# Patient Record
Sex: FEMALE | Race: WHITE | NOT HISPANIC OR LATINO | Employment: UNEMPLOYED | URBAN - METROPOLITAN AREA
[De-identification: names, ages, dates, MRNs, and addresses within clinical notes are randomized per-mention and may not be internally consistent; named-entity substitution may affect disease eponyms.]

---

## 2018-06-29 ENCOUNTER — TRANSCRIBE ORDERS (OUTPATIENT)
Dept: PHYSICAL THERAPY | Facility: CLINIC | Age: 58
End: 2018-06-29

## 2018-06-29 ENCOUNTER — TREATMENT (OUTPATIENT)
Dept: PHYSICAL THERAPY | Facility: CLINIC | Age: 58
End: 2018-06-29

## 2018-06-29 DIAGNOSIS — M25.561 ACUTE PAIN OF RIGHT KNEE: ICD-10-CM

## 2018-06-29 DIAGNOSIS — M17.10 ARTHRITIS OF KNEE: Primary | ICD-10-CM

## 2018-06-29 PROCEDURE — 97110 THERAPEUTIC EXERCISES: CPT | Performed by: PHYSICAL THERAPIST

## 2018-06-29 PROCEDURE — 97161 PT EVAL LOW COMPLEX 20 MIN: CPT | Performed by: PHYSICAL THERAPIST

## 2018-06-29 PROCEDURE — 97112 NEUROMUSCULAR REEDUCATION: CPT | Performed by: PHYSICAL THERAPIST

## 2018-06-29 PROCEDURE — 97035 APP MDLTY 1+ULTRASOUND EA 15: CPT | Performed by: PHYSICAL THERAPIST

## 2018-07-02 ENCOUNTER — TREATMENT (OUTPATIENT)
Dept: PHYSICAL THERAPY | Facility: CLINIC | Age: 58
End: 2018-07-02

## 2018-07-02 DIAGNOSIS — M25.561 ACUTE PAIN OF RIGHT KNEE: ICD-10-CM

## 2018-07-02 DIAGNOSIS — M17.10 ARTHRITIS OF KNEE: Primary | ICD-10-CM

## 2018-07-02 PROCEDURE — 97110 THERAPEUTIC EXERCISES: CPT | Performed by: PHYSICAL THERAPIST

## 2018-07-02 PROCEDURE — 97035 APP MDLTY 1+ULTRASOUND EA 15: CPT | Performed by: PHYSICAL THERAPIST

## 2018-07-02 PROCEDURE — 97112 NEUROMUSCULAR REEDUCATION: CPT | Performed by: PHYSICAL THERAPIST

## 2018-07-06 ENCOUNTER — TREATMENT (OUTPATIENT)
Dept: PHYSICAL THERAPY | Facility: CLINIC | Age: 58
End: 2018-07-06

## 2018-07-06 DIAGNOSIS — M25.561 ACUTE PAIN OF RIGHT KNEE: ICD-10-CM

## 2018-07-06 DIAGNOSIS — M17.10 ARTHRITIS OF KNEE: Primary | ICD-10-CM

## 2018-07-06 PROCEDURE — 97110 THERAPEUTIC EXERCISES: CPT | Performed by: PHYSICAL THERAPIST

## 2018-07-06 PROCEDURE — 97035 APP MDLTY 1+ULTRASOUND EA 15: CPT | Performed by: PHYSICAL THERAPIST

## 2018-07-11 ENCOUNTER — DOCUMENTATION (OUTPATIENT)
Dept: PHYSICAL THERAPY | Facility: CLINIC | Age: 58
End: 2018-07-11

## 2018-07-26 ENCOUNTER — DOCUMENTATION (OUTPATIENT)
Dept: PHYSICAL THERAPY | Facility: CLINIC | Age: 58
End: 2018-07-26

## 2022-03-04 ENCOUNTER — OFFICE (OUTPATIENT)
Dept: URBAN - METROPOLITAN AREA CLINIC 65 | Facility: CLINIC | Age: 62
End: 2022-03-04

## 2022-03-04 VITALS
SYSTOLIC BLOOD PRESSURE: 120 MMHG | HEART RATE: 81 BPM | DIASTOLIC BLOOD PRESSURE: 78 MMHG | WEIGHT: 239 LBS | HEIGHT: 65 IN

## 2022-03-04 DIAGNOSIS — R93.3 ABNORMAL FINDINGS ON DIAGNOSTIC IMAGING OF OTHER PARTS OF DI: ICD-10-CM

## 2022-03-04 DIAGNOSIS — R10.84 GENERALIZED ABDOMINAL PAIN: ICD-10-CM

## 2022-03-04 PROCEDURE — 99203 OFFICE O/P NEW LOW 30 MIN: CPT | Performed by: INTERNAL MEDICINE

## 2022-03-10 ENCOUNTER — OFFICE VISIT (OUTPATIENT)
Dept: CARDIOLOGY | Facility: CLINIC | Age: 62
End: 2022-03-10

## 2022-03-10 VITALS
WEIGHT: 239 LBS | RESPIRATION RATE: 16 BRPM | HEART RATE: 73 BPM | HEIGHT: 65 IN | DIASTOLIC BLOOD PRESSURE: 76 MMHG | BODY MASS INDEX: 39.82 KG/M2 | OXYGEN SATURATION: 98 % | SYSTOLIC BLOOD PRESSURE: 128 MMHG

## 2022-03-10 DIAGNOSIS — I10 PRIMARY HYPERTENSION: Primary | ICD-10-CM

## 2022-03-10 DIAGNOSIS — E78.49 OTHER HYPERLIPIDEMIA: ICD-10-CM

## 2022-03-10 PROCEDURE — 99204 OFFICE O/P NEW MOD 45 MIN: CPT | Performed by: INTERNAL MEDICINE

## 2022-03-10 PROCEDURE — 93000 ELECTROCARDIOGRAM COMPLETE: CPT | Performed by: INTERNAL MEDICINE

## 2022-03-10 RX ORDER — PIOGLITAZONEHYDROCHLORIDE 15 MG/1
15 TABLET ORAL DAILY
COMMUNITY
Start: 2022-02-19

## 2022-03-10 RX ORDER — SITAGLIPTIN AND METFORMIN HYDROCHLORIDE 1000; 50 MG/1; MG/1
1 TABLET, FILM COATED ORAL 2 TIMES DAILY WITH MEALS
COMMUNITY
Start: 2022-02-14

## 2022-03-10 RX ORDER — TRIAMTERENE AND HYDROCHLOROTHIAZIDE 37.5; 25 MG/1; MG/1
TABLET ORAL
COMMUNITY
Start: 2022-03-07

## 2022-03-10 RX ORDER — LISINOPRIL 10 MG/1
10 TABLET ORAL DAILY
COMMUNITY
Start: 2022-02-13

## 2022-03-10 RX ORDER — ATORVASTATIN CALCIUM 10 MG/1
10 TABLET, FILM COATED ORAL DAILY
COMMUNITY
Start: 2022-01-01

## 2022-03-10 RX ORDER — GLIMEPIRIDE 2 MG/1
TABLET ORAL
COMMUNITY
Start: 2022-03-01

## 2022-03-10 RX ORDER — DICYCLOMINE HYDROCHLORIDE 10 MG/1
CAPSULE ORAL
COMMUNITY
Start: 2022-02-02

## 2022-03-29 ENCOUNTER — OFFICE (OUTPATIENT)
Dept: URBAN - METROPOLITAN AREA CLINIC 66 | Facility: CLINIC | Age: 62
End: 2022-03-29

## 2022-03-29 VITALS
HEART RATE: 76 BPM | WEIGHT: 244 LBS | DIASTOLIC BLOOD PRESSURE: 71 MMHG | SYSTOLIC BLOOD PRESSURE: 113 MMHG | HEIGHT: 65 IN

## 2022-03-29 DIAGNOSIS — R10.11 RIGHT UPPER QUADRANT PAIN: ICD-10-CM

## 2022-03-29 PROCEDURE — 99213 OFFICE O/P EST LOW 20 MIN: CPT | Performed by: NURSE PRACTITIONER

## 2022-03-29 RX ORDER — OMEPRAZOLE 20 MG/1
20 CAPSULE, DELAYED RELEASE ORAL
Qty: 30 | Refills: 11 | Status: ACTIVE
Start: 2022-03-29

## 2022-09-22 ENCOUNTER — OFFICE VISIT (OUTPATIENT)
Dept: ORTHOPEDIC SURGERY | Facility: CLINIC | Age: 62
End: 2022-09-22

## 2022-09-22 VITALS — HEIGHT: 65 IN | BODY MASS INDEX: 41.47 KG/M2 | WEIGHT: 248.9 LBS | TEMPERATURE: 97.1 F

## 2022-09-22 DIAGNOSIS — R52 PAIN: Primary | ICD-10-CM

## 2022-09-22 DIAGNOSIS — M25.561 ACUTE PAIN OF RIGHT KNEE: ICD-10-CM

## 2022-09-22 PROCEDURE — 73562 X-RAY EXAM OF KNEE 3: CPT | Performed by: NURSE PRACTITIONER

## 2022-09-22 PROCEDURE — 99204 OFFICE O/P NEW MOD 45 MIN: CPT | Performed by: NURSE PRACTITIONER

## 2022-09-22 RX ORDER — MELOXICAM 7.5 MG/1
7.5 TABLET ORAL DAILY
Qty: 30 TABLET | Refills: 3 | Status: SHIPPED | OUTPATIENT
Start: 2022-09-22 | End: 2023-02-06

## 2022-09-22 RX ORDER — ORAL SEMAGLUTIDE 7 MG/1
1 TABLET ORAL DAILY
COMMUNITY
Start: 2022-09-03

## 2023-02-05 DIAGNOSIS — M25.561 ACUTE PAIN OF RIGHT KNEE: ICD-10-CM

## 2023-02-06 RX ORDER — MELOXICAM 7.5 MG/1
TABLET ORAL
Qty: 30 TABLET | Refills: 0 | Status: SHIPPED | OUTPATIENT
Start: 2023-02-06 | End: 2023-03-14

## 2023-03-07 ENCOUNTER — TELEPHONE (OUTPATIENT)
Dept: CARDIOLOGY | Facility: CLINIC | Age: 63
End: 2023-03-07

## 2023-03-14 DIAGNOSIS — M25.561 ACUTE PAIN OF RIGHT KNEE: ICD-10-CM

## 2023-03-14 RX ORDER — MELOXICAM 7.5 MG/1
TABLET ORAL
Qty: 30 TABLET | Refills: 0 | Status: SHIPPED | OUTPATIENT
Start: 2023-03-14

## 2023-04-13 DIAGNOSIS — M25.561 ACUTE PAIN OF RIGHT KNEE: ICD-10-CM

## 2023-04-13 RX ORDER — MELOXICAM 7.5 MG/1
TABLET ORAL
Qty: 30 TABLET | Refills: 0 | Status: SHIPPED | OUTPATIENT
Start: 2023-04-13 | End: 2023-04-18

## 2023-04-18 DIAGNOSIS — M25.561 ACUTE PAIN OF RIGHT KNEE: ICD-10-CM

## 2023-04-18 RX ORDER — MELOXICAM 7.5 MG/1
TABLET ORAL
Qty: 30 TABLET | Refills: 0 | Status: SHIPPED | OUTPATIENT
Start: 2023-04-18

## 2023-05-18 DIAGNOSIS — M25.561 ACUTE PAIN OF RIGHT KNEE: ICD-10-CM

## 2023-05-19 RX ORDER — MELOXICAM 7.5 MG/1
TABLET ORAL
Qty: 30 TABLET | Refills: 0 | Status: SHIPPED | OUTPATIENT
Start: 2023-05-19

## 2023-05-31 ENCOUNTER — OFFICE VISIT (OUTPATIENT)
Dept: CARDIOLOGY | Facility: CLINIC | Age: 63
End: 2023-05-31

## 2023-05-31 VITALS
WEIGHT: 242 LBS | SYSTOLIC BLOOD PRESSURE: 142 MMHG | HEART RATE: 64 BPM | BODY MASS INDEX: 40.32 KG/M2 | DIASTOLIC BLOOD PRESSURE: 84 MMHG | RESPIRATION RATE: 16 BRPM | OXYGEN SATURATION: 100 % | HEIGHT: 65 IN

## 2023-05-31 DIAGNOSIS — I10 PRIMARY HYPERTENSION: ICD-10-CM

## 2023-05-31 DIAGNOSIS — R60.0 EDEMA LEG: Primary | ICD-10-CM

## 2023-05-31 DIAGNOSIS — E78.49 OTHER HYPERLIPIDEMIA: ICD-10-CM

## 2023-05-31 DIAGNOSIS — R06.09 DOE (DYSPNEA ON EXERTION): ICD-10-CM

## 2023-05-31 PROCEDURE — 99214 OFFICE O/P EST MOD 30 MIN: CPT | Performed by: INTERNAL MEDICINE

## 2023-05-31 PROCEDURE — 93000 ELECTROCARDIOGRAM COMPLETE: CPT | Performed by: INTERNAL MEDICINE

## 2023-05-31 RX ORDER — FUROSEMIDE 20 MG/1
20 TABLET ORAL DAILY PRN
Qty: 30 TABLET | Refills: 2 | Status: SHIPPED | OUTPATIENT
Start: 2023-05-31

## 2023-11-06 ENCOUNTER — OFFICE VISIT (OUTPATIENT)
Dept: ORTHOPEDIC SURGERY | Facility: CLINIC | Age: 63
End: 2023-11-06
Payer: COMMERCIAL

## 2023-11-06 VITALS — HEIGHT: 65 IN | TEMPERATURE: 96.4 F | WEIGHT: 239 LBS | BODY MASS INDEX: 39.82 KG/M2

## 2023-11-06 DIAGNOSIS — R52 PAIN: Primary | ICD-10-CM

## 2023-11-06 DIAGNOSIS — M25.561 ACUTE PAIN OF RIGHT KNEE: ICD-10-CM

## 2023-11-06 PROCEDURE — 20610 DRAIN/INJ JOINT/BURSA W/O US: CPT | Performed by: NURSE PRACTITIONER

## 2023-11-06 PROCEDURE — 73562 X-RAY EXAM OF KNEE 3: CPT | Performed by: NURSE PRACTITIONER

## 2023-11-06 RX ORDER — METHYLPREDNISOLONE ACETATE 80 MG/ML
80 INJECTION, SUSPENSION INTRA-ARTICULAR; INTRALESIONAL; INTRAMUSCULAR; SOFT TISSUE
Status: COMPLETED | OUTPATIENT
Start: 2023-11-06 | End: 2023-11-06

## 2023-11-06 RX ADMIN — METHYLPREDNISOLONE ACETATE 80 MG: 80 INJECTION, SUSPENSION INTRA-ARTICULAR; INTRALESIONAL; INTRAMUSCULAR; SOFT TISSUE at 09:59

## 2024-04-28 DIAGNOSIS — M25.561 ACUTE PAIN OF RIGHT KNEE: ICD-10-CM

## 2024-04-29 RX ORDER — MELOXICAM 7.5 MG/1
TABLET ORAL
Qty: 30 TABLET | Refills: 0 | Status: SHIPPED | OUTPATIENT
Start: 2024-04-29

## 2024-05-24 DIAGNOSIS — M25.561 ACUTE PAIN OF RIGHT KNEE: ICD-10-CM

## 2024-05-24 RX ORDER — MELOXICAM 7.5 MG/1
TABLET ORAL
Qty: 30 TABLET | Refills: 0 | Status: SHIPPED | OUTPATIENT
Start: 2024-05-24

## 2024-06-19 DIAGNOSIS — M25.561 ACUTE PAIN OF RIGHT KNEE: ICD-10-CM

## 2024-06-20 RX ORDER — MELOXICAM 7.5 MG/1
TABLET ORAL
Qty: 30 TABLET | Refills: 0 | Status: SHIPPED | OUTPATIENT
Start: 2024-06-20

## 2024-07-15 DIAGNOSIS — M25.561 ACUTE PAIN OF RIGHT KNEE: ICD-10-CM

## 2024-07-15 RX ORDER — MELOXICAM 7.5 MG/1
TABLET ORAL
Qty: 30 TABLET | Refills: 0 | Status: SHIPPED | OUTPATIENT
Start: 2024-07-15

## 2024-07-22 ENCOUNTER — TELEPHONE (OUTPATIENT)
Dept: ORTHOPEDIC SURGERY | Facility: CLINIC | Age: 64
End: 2024-07-22

## 2024-07-29 ENCOUNTER — TELEPHONE (OUTPATIENT)
Dept: ORTHOPEDIC SURGERY | Facility: CLINIC | Age: 64
End: 2024-07-29

## 2024-07-30 ENCOUNTER — OFFICE VISIT (OUTPATIENT)
Dept: ORTHOPEDIC SURGERY | Facility: CLINIC | Age: 64
End: 2024-07-30
Payer: COMMERCIAL

## 2024-07-30 VITALS — HEIGHT: 65 IN | TEMPERATURE: 97.7 F | WEIGHT: 239 LBS | BODY MASS INDEX: 39.82 KG/M2

## 2024-07-30 DIAGNOSIS — M25.561 ACUTE PAIN OF RIGHT KNEE: Primary | ICD-10-CM

## 2024-07-30 PROCEDURE — 20610 DRAIN/INJ JOINT/BURSA W/O US: CPT | Performed by: NURSE PRACTITIONER

## 2024-07-30 RX ORDER — METHYLPREDNISOLONE ACETATE 80 MG/ML
80 INJECTION, SUSPENSION INTRA-ARTICULAR; INTRALESIONAL; INTRAMUSCULAR; SOFT TISSUE
Status: COMPLETED | OUTPATIENT
Start: 2024-07-30 | End: 2024-07-30

## 2024-07-30 RX ADMIN — METHYLPREDNISOLONE ACETATE 80 MG: 80 INJECTION, SUSPENSION INTRA-ARTICULAR; INTRALESIONAL; INTRAMUSCULAR; SOFT TISSUE at 14:52

## 2024-07-31 ENCOUNTER — TELEPHONE (OUTPATIENT)
Dept: ORTHOPEDIC SURGERY | Facility: CLINIC | Age: 64
End: 2024-07-31

## 2024-08-09 ENCOUNTER — PREP FOR SURGERY (OUTPATIENT)
Dept: SURGERY | Facility: SURGERY CENTER | Age: 64
End: 2024-08-09
Payer: COMMERCIAL

## 2024-08-09 ENCOUNTER — TELEPHONE (OUTPATIENT)
Dept: GASTROENTEROLOGY | Facility: CLINIC | Age: 64
End: 2024-08-09
Payer: COMMERCIAL

## 2024-08-09 ENCOUNTER — OFFICE VISIT (OUTPATIENT)
Dept: GASTROENTEROLOGY | Facility: CLINIC | Age: 64
End: 2024-08-09
Payer: COMMERCIAL

## 2024-08-09 VITALS
SYSTOLIC BLOOD PRESSURE: 132 MMHG | DIASTOLIC BLOOD PRESSURE: 72 MMHG | WEIGHT: 243.6 LBS | OXYGEN SATURATION: 98 % | HEART RATE: 88 BPM | BODY MASS INDEX: 40.54 KG/M2 | TEMPERATURE: 97 F

## 2024-08-09 DIAGNOSIS — E11.9 TYPE 2 DIABETES MELLITUS WITHOUT COMPLICATION, WITHOUT LONG-TERM CURRENT USE OF INSULIN: Chronic | ICD-10-CM

## 2024-08-09 DIAGNOSIS — Z12.11 COLON CANCER SCREENING: ICD-10-CM

## 2024-08-09 DIAGNOSIS — Z12.11 COLON CANCER SCREENING: Primary | ICD-10-CM

## 2024-08-09 DIAGNOSIS — R74.8 ELEVATED LIVER ENZYMES: Primary | Chronic | ICD-10-CM

## 2024-08-09 PROCEDURE — 99214 OFFICE O/P EST MOD 30 MIN: CPT | Performed by: NURSE PRACTITIONER

## 2024-08-09 RX ORDER — SODIUM CHLORIDE, SODIUM LACTATE, POTASSIUM CHLORIDE, CALCIUM CHLORIDE 600; 310; 30; 20 MG/100ML; MG/100ML; MG/100ML; MG/100ML
30 INJECTION, SOLUTION INTRAVENOUS CONTINUOUS PRN
OUTPATIENT
Start: 2024-08-09

## 2024-08-09 RX ORDER — GLIPIZIDE 5 MG/1
1 TABLET ORAL DAILY
COMMUNITY

## 2024-08-09 RX ORDER — SODIUM CHLORIDE 0.9 % (FLUSH) 0.9 %
3 SYRINGE (ML) INJECTION EVERY 12 HOURS SCHEDULED
OUTPATIENT
Start: 2024-08-09

## 2024-08-09 RX ORDER — TRIAMTERENE AND HYDROCHLOROTHIAZIDE 37.5; 25 MG/1; MG/1
TABLET ORAL
COMMUNITY

## 2024-08-09 RX ORDER — SODIUM CHLORIDE 0.9 % (FLUSH) 0.9 %
10 SYRINGE (ML) INJECTION AS NEEDED
OUTPATIENT
Start: 2024-08-09

## 2024-08-09 RX ORDER — FUROSEMIDE 40 MG/1
1 TABLET ORAL DAILY
COMMUNITY
End: 2024-08-09

## 2024-08-09 RX ORDER — SIMVASTATIN 10 MG
TABLET ORAL
COMMUNITY
End: 2024-08-09

## 2024-08-13 PROBLEM — Z12.11 COLON CANCER SCREENING: Status: ACTIVE | Noted: 2024-08-09

## 2024-08-30 ENCOUNTER — HOSPITAL ENCOUNTER (OUTPATIENT)
Facility: HOSPITAL | Age: 64
Discharge: HOME | End: 2024-08-30
Admitting: NURSE PRACTITIONER
Payer: COMMERCIAL

## 2024-08-30 DIAGNOSIS — R74.8 ELEVATED LIVER ENZYMES: Chronic | ICD-10-CM

## 2024-08-30 PROCEDURE — 76705 ECHO EXAM OF ABDOMEN: CPT

## 2024-09-04 ENCOUNTER — TELEPHONE (OUTPATIENT)
Dept: GASTROENTEROLOGY | Facility: CLINIC | Age: 64
End: 2024-09-04

## 2024-09-05 ENCOUNTER — TELEPHONE (OUTPATIENT)
Dept: GASTROENTEROLOGY | Facility: CLINIC | Age: 64
End: 2024-09-05

## 2024-09-05 ENCOUNTER — TELEPHONE (OUTPATIENT)
Dept: GASTROENTEROLOGY | Facility: CLINIC | Age: 64
End: 2024-09-05
Payer: COMMERCIAL

## 2024-09-05 DIAGNOSIS — K74.60 CIRRHOSIS OF LIVER WITHOUT ASCITES, UNSPECIFIED HEPATIC CIRRHOSIS TYPE: ICD-10-CM

## 2024-09-05 DIAGNOSIS — R93.2 ABNORMAL LIVER ULTRASOUND: Primary | ICD-10-CM

## 2024-09-17 ENCOUNTER — TELEPHONE (OUTPATIENT)
Dept: ORTHOPEDIC SURGERY | Facility: CLINIC | Age: 64
End: 2024-09-17
Payer: COMMERCIAL

## 2024-09-17 DIAGNOSIS — M25.561 ACUTE PAIN OF RIGHT KNEE: Primary | ICD-10-CM

## 2024-09-19 ENCOUNTER — TELEPHONE (OUTPATIENT)
Dept: CARDIOLOGY | Facility: CLINIC | Age: 64
End: 2024-09-19

## 2024-09-19 ENCOUNTER — TELEPHONE (OUTPATIENT)
Dept: ORTHOPEDIC SURGERY | Facility: CLINIC | Age: 64
End: 2024-09-19
Payer: COMMERCIAL

## 2024-10-03 DIAGNOSIS — K74.60 CIRRHOSIS OF LIVER WITHOUT ASCITES, UNSPECIFIED HEPATIC CIRRHOSIS TYPE: ICD-10-CM

## 2024-10-03 DIAGNOSIS — R93.2 ABNORMAL LIVER ULTRASOUND: ICD-10-CM

## 2024-10-10 ENCOUNTER — OFFICE VISIT (OUTPATIENT)
Dept: ORTHOPEDIC SURGERY | Facility: CLINIC | Age: 64
End: 2024-10-10
Payer: COMMERCIAL

## 2024-10-10 VITALS — TEMPERATURE: 97.5 F | BODY MASS INDEX: 41.79 KG/M2 | HEIGHT: 65 IN | WEIGHT: 250.8 LBS

## 2024-10-10 DIAGNOSIS — M25.561 ACUTE PAIN OF RIGHT KNEE: ICD-10-CM

## 2024-10-10 DIAGNOSIS — M17.11 PRIMARY OSTEOARTHRITIS OF RIGHT KNEE: Primary | ICD-10-CM

## 2024-10-10 PROCEDURE — 99214 OFFICE O/P EST MOD 30 MIN: CPT | Performed by: NURSE PRACTITIONER

## 2024-10-10 RX ORDER — MELOXICAM 7.5 MG/1
7.5 TABLET ORAL DAILY
COMMUNITY
Start: 2024-09-17

## 2024-10-14 ENCOUNTER — TELEPHONE (OUTPATIENT)
Dept: GASTROENTEROLOGY | Facility: CLINIC | Age: 64
End: 2024-10-14
Payer: COMMERCIAL

## 2024-10-14 RX ORDER — HYDROCORTISONE 25 MG/G
CREAM TOPICAL
Qty: 30 G | Refills: 1 | Status: SHIPPED | OUTPATIENT
Start: 2024-10-14

## 2024-10-17 ENCOUNTER — TELEPHONE (OUTPATIENT)
Dept: GASTROENTEROLOGY | Facility: CLINIC | Age: 64
End: 2024-10-17
Payer: COMMERCIAL

## 2024-10-21 ENCOUNTER — OFFICE VISIT (OUTPATIENT)
Dept: CARDIOLOGY | Facility: CLINIC | Age: 64
End: 2024-10-21
Payer: COMMERCIAL

## 2024-10-21 VITALS
SYSTOLIC BLOOD PRESSURE: 122 MMHG | DIASTOLIC BLOOD PRESSURE: 81 MMHG | WEIGHT: 249.4 LBS | OXYGEN SATURATION: 99 % | HEART RATE: 64 BPM | BODY MASS INDEX: 41.55 KG/M2 | HEIGHT: 65 IN

## 2024-10-21 DIAGNOSIS — E78.49 OTHER HYPERLIPIDEMIA: ICD-10-CM

## 2024-10-21 DIAGNOSIS — I10 PRIMARY HYPERTENSION: Primary | ICD-10-CM

## 2024-10-21 DIAGNOSIS — I89.0 LYMPHEDEMA OF LEFT LEG: ICD-10-CM

## 2024-10-21 PROCEDURE — 99214 OFFICE O/P EST MOD 30 MIN: CPT | Performed by: NURSE PRACTITIONER

## 2024-10-21 PROCEDURE — 93000 ELECTROCARDIOGRAM COMPLETE: CPT | Performed by: NURSE PRACTITIONER

## 2024-10-28 ENCOUNTER — HOSPITAL ENCOUNTER (OUTPATIENT)
Facility: SURGERY CENTER | Age: 64
Setting detail: HOSPITAL OUTPATIENT SURGERY
Discharge: HOME | End: 2024-10-28
Attending: INTERNAL MEDICINE | Admitting: INTERNAL MEDICINE
Payer: COMMERCIAL

## 2024-10-28 ENCOUNTER — ANESTHESIA (OUTPATIENT)
Dept: SURGERY | Facility: SURGERY CENTER | Age: 64
End: 2024-10-28
Payer: COMMERCIAL

## 2024-10-28 ENCOUNTER — ANESTHESIA EVENT (OUTPATIENT)
Dept: SURGERY | Facility: SURGERY CENTER | Age: 64
End: 2024-10-28
Payer: COMMERCIAL

## 2024-10-28 VITALS
SYSTOLIC BLOOD PRESSURE: 135 MMHG | DIASTOLIC BLOOD PRESSURE: 71 MMHG | BODY MASS INDEX: 39.94 KG/M2 | RESPIRATION RATE: 16 BRPM | OXYGEN SATURATION: 98 % | WEIGHT: 240 LBS | TEMPERATURE: 97.5 F | HEART RATE: 66 BPM

## 2024-10-28 DIAGNOSIS — Z12.11 COLON CANCER SCREENING: ICD-10-CM

## 2024-10-28 LAB — GLUCOSE BLDC GLUCOMTR-MCNC: 110 MG/DL (ref 70–130)

## 2024-10-28 PROCEDURE — 45378 DIAGNOSTIC COLONOSCOPY: CPT | Performed by: INTERNAL MEDICINE

## 2024-10-28 PROCEDURE — 25010000002 PROPOFOL 10 MG/ML EMULSION: Performed by: ANESTHESIOLOGY

## 2024-10-28 PROCEDURE — 25010000002 LIDOCAINE 1 % SOLUTION: Performed by: ANESTHESIOLOGY

## 2024-10-28 PROCEDURE — 25810000003 LACTATED RINGERS PER 1000 ML: Performed by: NURSE PRACTITIONER

## 2024-10-28 RX ORDER — NALOXONE HCL 0.4 MG/ML
0.2 VIAL (ML) INJECTION AS NEEDED
Status: DISCONTINUED | OUTPATIENT
Start: 2024-10-28 | End: 2024-10-28 | Stop reason: HOSPADM

## 2024-10-28 RX ORDER — PROMETHAZINE HYDROCHLORIDE 25 MG/1
25 SUPPOSITORY RECTAL ONCE AS NEEDED
Status: DISCONTINUED | OUTPATIENT
Start: 2024-10-28 | End: 2024-10-28 | Stop reason: HOSPADM

## 2024-10-28 RX ORDER — DROPERIDOL 2.5 MG/ML
0.62 INJECTION, SOLUTION INTRAMUSCULAR; INTRAVENOUS
Status: DISCONTINUED | OUTPATIENT
Start: 2024-10-28 | End: 2024-10-28 | Stop reason: HOSPADM

## 2024-10-28 RX ORDER — FLUMAZENIL 0.1 MG/ML
0.2 INJECTION INTRAVENOUS AS NEEDED
Status: DISCONTINUED | OUTPATIENT
Start: 2024-10-28 | End: 2024-10-28 | Stop reason: HOSPADM

## 2024-10-28 RX ORDER — SODIUM CHLORIDE 0.9 % (FLUSH) 0.9 %
3 SYRINGE (ML) INJECTION EVERY 12 HOURS SCHEDULED
Status: DISCONTINUED | OUTPATIENT
Start: 2024-10-28 | End: 2024-10-28 | Stop reason: HOSPADM

## 2024-10-28 RX ORDER — PROMETHAZINE HYDROCHLORIDE 12.5 MG/1
25 TABLET ORAL ONCE AS NEEDED
Status: DISCONTINUED | OUTPATIENT
Start: 2024-10-28 | End: 2024-10-28 | Stop reason: HOSPADM

## 2024-10-28 RX ORDER — SODIUM CHLORIDE 0.9 % (FLUSH) 0.9 %
10 SYRINGE (ML) INJECTION AS NEEDED
Status: DISCONTINUED | OUTPATIENT
Start: 2024-10-28 | End: 2024-10-28 | Stop reason: HOSPADM

## 2024-10-28 RX ORDER — FENTANYL CITRATE 50 UG/ML
50 INJECTION, SOLUTION INTRAMUSCULAR; INTRAVENOUS
Status: DISCONTINUED | OUTPATIENT
Start: 2024-10-28 | End: 2024-10-28 | Stop reason: HOSPADM

## 2024-10-28 RX ORDER — LIDOCAINE HYDROCHLORIDE 10 MG/ML
INJECTION, SOLUTION INFILTRATION; PERINEURAL AS NEEDED
Status: DISCONTINUED | OUTPATIENT
Start: 2024-10-28 | End: 2024-10-28 | Stop reason: SURG

## 2024-10-28 RX ORDER — SODIUM CHLORIDE, SODIUM LACTATE, POTASSIUM CHLORIDE, CALCIUM CHLORIDE 600; 310; 30; 20 MG/100ML; MG/100ML; MG/100ML; MG/100ML
30 INJECTION, SOLUTION INTRAVENOUS CONTINUOUS PRN
Status: DISCONTINUED | OUTPATIENT
Start: 2024-10-28 | End: 2024-10-28 | Stop reason: HOSPADM

## 2024-10-28 RX ORDER — DIPHENHYDRAMINE HYDROCHLORIDE 50 MG/ML
12.5 INJECTION INTRAMUSCULAR; INTRAVENOUS
Status: DISCONTINUED | OUTPATIENT
Start: 2024-10-28 | End: 2024-10-28 | Stop reason: HOSPADM

## 2024-10-28 RX ORDER — ONDANSETRON 2 MG/ML
4 INJECTION INTRAMUSCULAR; INTRAVENOUS ONCE AS NEEDED
Status: DISCONTINUED | OUTPATIENT
Start: 2024-10-28 | End: 2024-10-28 | Stop reason: HOSPADM

## 2024-10-28 RX ADMIN — PROPOFOL 200 MCG/KG/MIN: 10 INJECTION, EMULSION INTRAVENOUS at 12:55

## 2024-10-28 RX ADMIN — SODIUM CHLORIDE, SODIUM LACTATE, POTASSIUM CHLORIDE, AND CALCIUM CHLORIDE 30 ML/HR: .6; .31; .03; .02 INJECTION, SOLUTION INTRAVENOUS at 12:29

## 2024-10-28 RX ADMIN — LIDOCAINE HYDROCHLORIDE 30 MG: 10 INJECTION, SOLUTION INFILTRATION; PERINEURAL at 12:55

## 2024-10-28 NOTE — ANESTHESIA PREPROCEDURE EVALUATION
Anesthesia Evaluation     history of anesthetic complications:  PONV  NPO Solid Status: > 8 hours             Airway   Mallampati: I  TM distance: >3 FB  Neck ROM: full  Dental - normal exam     Pulmonary - normal exam   (+) ,sleep apnea on CPAP  Cardiovascular - normal exam    (+) hypertension, hyperlipidemia      Neuro/Psych  GI/Hepatic/Renal/Endo    (+) obesity, diabetes mellitus    Musculoskeletal     Abdominal    Substance History      OB/GYN          Other                    Anesthesia Plan    ASA 3     MAC       Anesthetic plan, risks, benefits, and alternatives have been provided, discussed and informed consent has been obtained with: patient.    CODE STATUS:

## 2024-10-28 NOTE — H&P
No chief complaint on file.      HPI  screening         Problem List:    Patient Active Problem List   Diagnosis    Primary hypertension    Other hyperlipidemia    Colon cancer screening       Medical History:    Past Medical History:   Diagnosis Date    Cancer     Diabetes mellitus     Hyperlipidemia         Social History:    Social History     Socioeconomic History    Marital status:    Tobacco Use    Smoking status: Never    Smokeless tobacco: Never   Vaping Use    Vaping status: Never Used   Substance and Sexual Activity    Alcohol use: No    Drug use: No    Sexual activity: Yes     Partners: Male       Family History:   Family History   Problem Relation Age of Onset    Hypertension Father     Heart disease Father     Anuerysm Father     Heart disease Sister     Heart disease Paternal Aunt     Heart disease Paternal Uncle     Heart disease Paternal Grandfather     Colon cancer Neg Hx     Colon polyps Neg Hx     Crohn's disease Neg Hx     Irritable bowel syndrome Neg Hx     Ulcerative colitis Neg Hx        Surgical History:   Past Surgical History:   Procedure Laterality Date    CHOLECYSTECTOMY      COLONOSCOPY      HYSTERECTOMY         No current facility-administered medications for this encounter.    Current Outpatient Medications:     atorvastatin (LIPITOR) 10 MG tablet, Take 1 tablet by mouth Daily., Disp: , Rfl:     glipizide (GLUCOTROL) 5 MG tablet, Take 1 tablet by mouth Daily., Disp: , Rfl:     Hydrocortisone, Perianal, (ANUSOL-HC) 2.5 % rectal cream, Apply to hemorrhoids twice daily for 10 days, Disp: 30 g, Rfl: 1    Janumet  MG per tablet, Take 1 tablet by mouth 2 (Two) Times a Day With Meals., Disp: , Rfl:     lisinopril (PRINIVIL,ZESTRIL) 10 MG tablet, Take 1 tablet by mouth Daily., Disp: , Rfl:     meloxicam (MOBIC) 7.5 MG tablet, Take 1 tablet by mouth once daily, Disp: 30 tablet, Rfl: 0    meloxicam (MOBIC) 7.5 MG tablet, Take 1 tablet by mouth Daily., Disp: , Rfl:     pioglitazone  (ACTOS) 15 MG tablet, Take 1 tablet by mouth Daily., Disp: , Rfl:     triamterene-hydrochlorothiazide (Maxzide-25) 37.5-25 MG per tablet, Maxzide, Disp: , Rfl:     Allergies: No Known Allergies     The following portions of the patient's history were reviewed by me and updated as appropriate: review of systems, allergies, current medications, past family history, past medical history, past social history, past surgical history and problem list.    There were no vitals filed for this visit.    PHYSICAL EXAM:    CONSTITUTIONAL:  today's vital signs reviewed by me  GASTROINTESTINAL: abdomen is soft nontender nondistended with normal active bowel sounds, no masses are appreciated    Assessment/ Plan  Screening    colonoscopy    Risks and benefits as well as alternatives to endoscopic evaluation were explained to the patient and they voiced understanding and wish to proceed.  These risks include but are not limited to the risk of bleeding, perforation, adverse reaction to sedation, and missed lesions.  The patient was given the opportunity to ask questions prior to the endoscopic procedure.

## 2024-10-28 NOTE — ANESTHESIA POSTPROCEDURE EVALUATION
Patient: Renay Perez    Procedure Summary       Date: 10/28/24 Room / Location: [facility]  [facility]    Anesthesia Start: 1253 Anesthesia Stop: 1317    Procedure: COLONOSCOPY TO CECUM Diagnosis:       Colon cancer screening      (Colon cancer screening [Z12.11])    Surgeons: Aime Tsai MD Provider: Ricardo Gutierres MD    Anesthesia Type: MAC ASA Status: 3            Anesthesia Type: MAC    Vitals  Vitals Value Taken Time   /71 10/28/24 1337   Temp 36.4 °C (97.5 °F) 10/28/24 1317   Pulse 66 10/28/24 1337   Resp 16 10/28/24 1337   SpO2 98 % 10/28/24 1337           Post Anesthesia Care and Evaluation    Patient location during evaluation: bedside  Pain management: adequate    Airway patency: patent  Anesthetic complications: No anesthetic complications    Cardiovascular status: acceptable  Respiratory status: acceptable  Hydration status: acceptable

## 2024-11-06 ENCOUNTER — TELEPHONE (OUTPATIENT)
Dept: ORTHOPEDIC SURGERY | Facility: CLINIC | Age: 64
End: 2024-11-06
Payer: COMMERCIAL

## 2024-11-06 NOTE — TELEPHONE ENCOUNTER
Caller: Renay Perez    Relationship: Self    Best call back number: [phone]    What was the call regarding: PT CALLING TO RELAY THAT THE PT'S INSURANCE COMPANY STATES THEY NEVER RECEIVED AN ORDER FOR THE R KNEE GEL INJECTION THAT WAS SUPPOSEDLY DENIED PREVIOUSLY. PT IS UNSURE WHAT HAPPENED WITH THE GEL INJECTION THAT SHE WAS SUPPOSED TO RECEIVE ON 10/10/24 WITH WILBER MARTINEZ, SINCE HER INSURANCE COMPANY IS UNAWARE OF ANY DENIAL FOR THE INJECTION. PLEASE CONTACT PT TO DISCUSS.      28.2

## 2024-11-06 NOTE — TELEPHONE ENCOUNTER
I have spoken with Mrs. Perez regarding gel injection are not a covered benefit under her plan//appt scheduled for josé miguel injections and to discuss surg

## 2024-11-25 ENCOUNTER — OFFICE VISIT (OUTPATIENT)
Dept: ORTHOPEDIC SURGERY | Facility: CLINIC | Age: 64
End: 2024-11-25
Payer: COMMERCIAL

## 2024-11-25 VITALS — HEIGHT: 65 IN | BODY MASS INDEX: 41.09 KG/M2 | WEIGHT: 246.6 LBS | TEMPERATURE: 98.5 F

## 2024-11-25 DIAGNOSIS — R52 PAIN: Primary | ICD-10-CM

## 2024-11-25 DIAGNOSIS — M17.11 PRIMARY OSTEOARTHRITIS OF RIGHT KNEE: ICD-10-CM

## 2024-11-25 RX ORDER — METHYLPREDNISOLONE ACETATE 80 MG/ML
80 INJECTION, SUSPENSION INTRA-ARTICULAR; INTRALESIONAL; INTRAMUSCULAR; SOFT TISSUE
Status: COMPLETED | OUTPATIENT
Start: 2024-11-25 | End: 2024-11-25

## 2024-11-25 RX ADMIN — METHYLPREDNISOLONE ACETATE 80 MG: 80 INJECTION, SUSPENSION INTRA-ARTICULAR; INTRALESIONAL; INTRAMUSCULAR; SOFT TISSUE at 10:21

## 2024-11-25 NOTE — PROGRESS NOTES
"Patient: Renay Perez  YOB: 1960 64 y.o. female  Medical Record Number: [id number]    Chief Complaints:   Chief Complaint   Patient presents with   • Left Knee - Pain, Follow-up   • Right Knee - Pain, Follow-up       History of Present Illness:Renay Perez is a 64 y.o. female who presents with complaints of worsening in right knee pain.  She only gets temporary relief from the injections she otherwise has tried and failed all other conservative measures, she would like to discuss surgical options today    Allergies: No Known Allergies    Medications:   Current Outpatient Medications   Medication Sig Dispense Refill   • atorvastatin (LIPITOR) 10 MG tablet Take 1 tablet by mouth Daily.     • glipizide (GLUCOTROL) 5 MG tablet Take 1 tablet by mouth Daily.     • Hydrocortisone, Perianal, (ANUSOL-HC) 2.5 % rectal cream Apply to hemorrhoids twice daily for 10 days 30 g 1   • Janumet  MG per tablet Take 1 tablet by mouth 2 (Two) Times a Day With Meals.     • lisinopril (PRINIVIL,ZESTRIL) 10 MG tablet Take 1 tablet by mouth Daily.     • meloxicam (MOBIC) 7.5 MG tablet Take 1 tablet by mouth once daily 30 tablet 0   • pioglitazone (ACTOS) 15 MG tablet Take 1 tablet by mouth Daily.     • triamterene-hydrochlorothiazide (Maxzide-25) 37.5-25 MG per tablet Maxzide       No current facility-administered medications for this visit.         The following portions of the patient's history were reviewed and updated as appropriate: allergies, current medications, past family history, past medical history, past social history, past surgical history and problem list.    Review of Systems:   14 point review of systems was performed. All systems negative except pertinent positives/negatives listed in HPI above    Physical Exam:   Vitals:    11/25/24 1003   Temp: 98.5 °F (36.9 °C)   Weight: 112 kg (246 lb 9.6 oz)   Height: 165.1 cm (65\")   PainSc: 0-No pain   PainLoc: Knee       General: A and O x 3, ASA, NAD    Skin " clear no unusual lesions noted  Right knee patient has no appreciable effusion 120 degrees flexion neutral in extension      Radiology:  Xrays 3views (ap,lateral, sunrise) right knee were ordered and reviewed today secondary to increased pain show bone-on-bone end-stage osteoarthritis with cyst and spur formation.  She has complete destruction of all 3 joint spaces.  Compared to views are unchanged    Assessment/Plan: End-stage osteoarthritis right knee with increased pain    Patient and I discussed options including continued conservative measures such as injections, physical therapy, medications, versus total knee replacement.  Patient would like to hold off on surgery would like to think about it further and will let us know if that something she would like to proceed with she would like a right knee cortisone injection today    Continuation of conservative management vs. TKA discussed.  The patient wishes to proceed with total knee replacement.  At this point the patient has failed the full compliment of conservative treatment and stating complete understanding of the risks/benefits/ anternatives wishes to proceed with surgical treatment.    Risk and benefits of surgery were reviewed.  Including, but not limited to, blood clots or pulmonary embolism, anesthesia risk, infection, fracture, skin/leg numbness, persistent pain/crepitance/popping/catching, failure of the implant, need for future surgeries, hematoma, possible nerve or blood vessel injury, need for transfusion, and potential risk of stroke,heart attack or death, among others.  The patient understands and wishes to proceed.     It was explained that if tissue has been repaired or reconstructed, there is also an increased chance of failure which may require further management.  Following the completion of the discussion, the patient expressed understanding of this planned course of care, all their questions were answered and consent will be obtained  preoperatively.    Operative Plan: [facility] Total Knee Replacement performing the procedure on an outpatient basis with home health rehab     Large Joint Arthrocentesis: R knee  Date/Time: 11/25/2024 10:21 AM  Consent given by: patient  Site marked: site marked  Timeout: Immediately prior to procedure a time out was called to verify the correct patient, procedure, equipment, support staff and site/side marked as required   Supporting Documentation  Indications: pain and joint swelling   Procedure Details  Location: knee - R knee  Preparation: Patient was prepped and draped in the usual sterile fashion  Needle size: 22 G  Approach: anterolateral  Medications administered: 80 mg methylPREDNISolone acetate 80 MG/ML; 2 mL lidocaine (cardiac)  Patient tolerance: patient tolerated the procedure well with no immediate complications         Yumi Reyes, APRN  11/25/2024

## 2024-12-12 ENCOUNTER — OFFICE VISIT (OUTPATIENT)
Dept: GASTROENTEROLOGY | Facility: CLINIC | Age: 64
End: 2024-12-12
Payer: COMMERCIAL

## 2024-12-12 VITALS
OXYGEN SATURATION: 97 % | WEIGHT: 251.4 LBS | DIASTOLIC BLOOD PRESSURE: 74 MMHG | HEART RATE: 71 BPM | TEMPERATURE: 96.6 F | BODY MASS INDEX: 41.88 KG/M2 | SYSTOLIC BLOOD PRESSURE: 138 MMHG | HEIGHT: 65 IN

## 2024-12-12 DIAGNOSIS — K57.90 DIVERTICULOSIS: ICD-10-CM

## 2024-12-12 DIAGNOSIS — K76.0 FATTY LIVER: Primary | ICD-10-CM

## 2024-12-12 PROCEDURE — 99214 OFFICE O/P EST MOD 30 MIN: CPT | Performed by: NURSE PRACTITIONER

## 2024-12-12 RX ORDER — GLIMEPIRIDE 2 MG/1
1 TABLET ORAL 2 TIMES DAILY WITH MEALS
COMMUNITY
Start: 2024-11-19

## 2024-12-12 RX ORDER — AMITRIPTYLINE HYDROCHLORIDE 10 MG/1
1 TABLET ORAL NIGHTLY
COMMUNITY
Start: 2024-11-19 | End: 2025-11-19

## 2024-12-12 NOTE — PROGRESS NOTES
"Chief Complaint   Patient presents with    Hepatic Disease         History of Present Illness  Patient is a 64-year-old female who presents today for follow-up. She is a new patient to me.  She has a history of elevated liver enzymes.  Underwent serologic workup of this which was unremarkable.  She had an ultrasound that showed changes concerning for cirrhosis.  She then had a FibroScan completed which showed as 0 to F1 fibrosis and S3 steatosis. She also had a colonoscopy completed for screening purposes that showed diverticulosis and internal hemorrhoids. ELF was 11.11 consisted with mid risk for development of cirrhosis.    Patient presents today for follow-up after testing.  She is overall feeling well.  She struggles with constipation which has responded well to senna but otherwise denies any GI complaints.     Result Review :       Colonoscopy (10/28/2024 12:46)    ELFQUEST (10/23/2024 10:14)    US Elastography Parenchyma (09/25/2024)    US Liver (08/30/2024 10:30)    Office Visit with Charisse Andrews APRN (08/09/2024)    Vital Signs:   /74   Pulse 71   Temp 96.6 °F (35.9 °C)   Ht 165.1 cm (65\")   Wt 114 kg (251 lb 6.4 oz)   SpO2 97%   BMI 41.84 kg/m²     Body mass index is 41.84 kg/m².     Physical Exam  Vitals reviewed.   Constitutional:       General: She is not in acute distress.     Appearance: She is well-developed.   HENT:      Head: Normocephalic and atraumatic.   Pulmonary:      Effort: Pulmonary effort is normal. No respiratory distress.   Skin:     General: Skin is dry.      Coloration: Skin is not pale.   Neurological:      Mental Status: She is alert and oriented to person, place, and time.   Psychiatric:         Thought Content: Thought content normal.           Assessment and Plan    Diagnoses and all orders for this visit:    1. Fatty liver (Primary)    2. Diverticulosis         Discussion  Patient presents today for follow-up after testing.  She has a history of elevated liver " enzymes.  Serologic workup was negative.  Ultrasound showed changes of the liver concerning for cirrhosis.  She then underwent FibroScan with no evidence of cirrhosis.  This did show moderate to severe fat in the liver which suspect is the cause of liver enzyme elevation but minimal to no fibrosis which we discussed is very reassuring. ELF score does show moderate risk for cirrhosis.  We discussed based on workup, do not feel she has cirrhosis but does have fatty liver and will be at moderate risk for development of cirrhosis with time.  As no fibrosis is present, we discussed she is in a good place to improve liver health with weight loss.    We reviewed dietary and lifestyle modifications to help with fatty liver at today's office visit and weight loss was encouraged.  We will consider repeating FibroScan in 1 to 2 years for reassessment and to reevaluate for fibrosis.    Colonoscopy with evidence of diverticulosis but otherwise normal.  Encouraged high-fiber diet.  Next colon cancer screening due in 7 years.  Constipation currently controlled with Senokot.  If constipation worsens, could consider prescription therapy if needed.          Follow Up   Return in about 1 year (around 12/12/2025).    Patient Instructions   For fatty liver, weight loss is recommended. Recommend following a low fat and low sugar diet. Recommend management of diabetes and elevated cholesterol with primary care provider if indicated. Regular exercise is recommended. Alcohol avoidance is recommended.    For fatty liver, start taking milk thistle daily, available over the counter.     For diverticulosis, follow a high-fiber diet.  Consider starting a daily fiber supplement, such as Metamucil or Citrucel, available over-the-counter.

## 2024-12-12 NOTE — PATIENT INSTRUCTIONS
For fatty liver, weight loss is recommended. Recommend following a low fat and low sugar diet. Recommend management of diabetes and elevated cholesterol with primary care provider if indicated. Regular exercise is recommended. Alcohol avoidance is recommended.    For fatty liver, start taking milk thistle daily, available over the counter.     For diverticulosis, follow a high-fiber diet.  Consider starting a daily fiber supplement, such as Metamucil or Citrucel, available over-the-counter.

## 2025-04-14 ENCOUNTER — PREP FOR SURGERY (OUTPATIENT)
Dept: OTHER | Facility: HOSPITAL | Age: 65
End: 2025-04-14
Payer: MEDICARE

## 2025-04-14 ENCOUNTER — TELEPHONE (OUTPATIENT)
Dept: ORTHOPEDIC SURGERY | Facility: CLINIC | Age: 65
End: 2025-04-14

## 2025-04-14 ENCOUNTER — OFFICE VISIT (OUTPATIENT)
Dept: ORTHOPEDIC SURGERY | Facility: CLINIC | Age: 65
End: 2025-04-14
Payer: MEDICARE

## 2025-04-14 VITALS — BODY MASS INDEX: 42.34 KG/M2 | TEMPERATURE: 98.2 F | WEIGHT: 254.1 LBS | HEIGHT: 65 IN

## 2025-04-14 DIAGNOSIS — M17.11 PRIMARY OSTEOARTHRITIS OF RIGHT KNEE: Primary | ICD-10-CM

## 2025-04-14 PROCEDURE — 1160F RVW MEDS BY RX/DR IN RCRD: CPT | Performed by: NURSE PRACTITIONER

## 2025-04-14 PROCEDURE — 1159F MED LIST DOCD IN RCRD: CPT | Performed by: NURSE PRACTITIONER

## 2025-04-14 PROCEDURE — 99214 OFFICE O/P EST MOD 30 MIN: CPT | Performed by: NURSE PRACTITIONER

## 2025-04-14 RX ORDER — PREGABALIN 75 MG/1
150 CAPSULE ORAL ONCE
OUTPATIENT
Start: 2025-04-14 | End: 2025-04-14

## 2025-04-14 RX ORDER — CHLORHEXIDINE GLUCONATE 500 MG/1
CLOTH TOPICAL 2 TIMES DAILY
OUTPATIENT
Start: 2025-04-14

## 2025-04-14 NOTE — TELEPHONE ENCOUNTER
Spoke to patient and she voiced understanding    She stated she is waiting for Mara to call her to schedule her surgery

## 2025-04-14 NOTE — TELEPHONE ENCOUNTER
Noted. Patient was just seen this morning - will be following up to schedule within 2 office days.

## 2025-04-14 NOTE — PROGRESS NOTES
Patient: Renay Perez  YOB: 1960 65 y.o. female  Medical Record Number: [id number]    Chief Complaints:   Chief Complaint   Patient presents with    Right Knee - Follow-up       History of Present Illness:Renay Perez is a 65 y.o. female who presents with complaints of worsening of right knee pain, she has tried and failed conservative measures including injections, physical therapy, medications, she would like to discuss surgery    Allergies: No Known Allergies    Medications:   Current Outpatient Medications   Medication Sig Dispense Refill    amitriptyline (ELAVIL) 10 MG tablet Take 1 tablet by mouth Every Night.      atorvastatin (LIPITOR) 10 MG tablet Take 1 tablet by mouth Daily.      glimepiride (AMARYL) 2 MG tablet Take 1 tablet by mouth 2 (Two) Times a Day With Meals.      glipizide (GLUCOTROL) 5 MG tablet Take 1 tablet by mouth Daily.      Hydrocortisone, Perianal, (ANUSOL-HC) 2.5 % rectal cream Apply to hemorrhoids twice daily for 10 days 30 g 1    Janumet  MG per tablet Take 1 tablet by mouth 2 (Two) Times a Day With Meals.      lisinopril (PRINIVIL,ZESTRIL) 10 MG tablet Take 1 tablet by mouth Daily.      meloxicam (MOBIC) 7.5 MG tablet Take 1 tablet by mouth once daily 30 tablet 0    pioglitazone (ACTOS) 15 MG tablet Take 1 tablet by mouth Daily.      triamterene-hydrochlorothiazide (Maxzide-25) 37.5-25 MG per tablet Maxzide       No current facility-administered medications for this visit.         The following portions of the patient's history were reviewed and updated as appropriate: allergies, current medications, past family history, past medical history, past social history, past surgical history and problem list.    Review of Systems:   14 point review of systems was performed. All systems negative except pertinent positives/negatives listed in HPI above    Physical Exam:   Vitals:    04/14/25 0920   Temp: 98.2 °F (36.8 °C)   Weight: 115 kg (254 lb 1.6 oz)   Height: 165.1 cm  "(65\")       General: A and O x 3, ASA, NAD    Skin clear no unusual lesions noted  Right knee patient has trace amount of effusion noted with limited range of motion secondary to pain, she does walk with a severe antalgic gait      Radiology:  Xrays 3views (ap,lateral, sunrise) previous x-rays of the right knee as well as 3 views ordered and reviewed today show bone-on-bone end-stage osteoarthritis.  Compared to views are unchanged    Assessment/Plan: End-stage osteoarthritis right knee with increasing pain    Patient and I discussed options, she would like to proceed with right total knee replacement same day home health.  Will continue to work on her lymphedema on her nonoperative leg which has improved and should also help with her current BMI.  She understands that her BMI does need to be less than 40.  Continuation of conservative management vs. TKA discussed.  The patient wishes to proceed with total knee replacement.  At this point the patient has failed the full compliment of conservative treatment and stating complete understanding of the risks/benefits/ anternatives wishes to proceed with surgical treatment.    Risk and benefits of surgery were reviewed.  Including, but not limited to, blood clots or pulmonary embolism, anesthesia risk, infection, fracture, skin/leg numbness, persistent pain/crepitance/popping/catching, failure of the implant, need for future surgeries, hematoma, possible nerve or blood vessel injury, need for transfusion, and potential risk of stroke,heart attack or death, among others.  The patient understands and wishes to proceed.     It was explained that if tissue has been repaired or reconstructed, there is also an increased chance of failure which may require further management.  Following the completion of the discussion, the patient expressed understanding of this planned course of care, all their questions were answered and consent will be obtained preoperatively.    Operative " Plan: [facility] Total Knee Replacement performing the procedure on an outpatient basis with home health rehab       Yumi Reyes, APRN  4/14/2025

## 2025-04-14 NOTE — TELEPHONE ENCOUNTER
Caller: LINETTE   Relationship to Patient: SELF  Phone Number: [phone]  Reason for Call: JARED WOULD LIKE TO KNOW IF SHE CAN WEAR A KNEE BRACE ON HER RIGHT KNEE PRIOR TO SX

## 2025-04-15 ENCOUNTER — TELEPHONE (OUTPATIENT)
Dept: ORTHOPEDIC SURGERY | Facility: CLINIC | Age: 65
End: 2025-04-15
Payer: MEDICARE

## 2025-04-15 NOTE — TELEPHONE ENCOUNTER
When speaking with patient about scheduling Rt TKA, I noticed patient is scheduled for rt carpal tunnel release on 5/16/25 w/ Dr. Iglesias.

## 2025-04-15 NOTE — TELEPHONE ENCOUNTER
Patient would need at least a month out from their carpal tunnel procedure before scheduling her surgery

## 2025-04-17 NOTE — TELEPHONE ENCOUNTER
Caller: Renay Perez    Relationship to patient: Self    Best call back number: [phone]     Patient is needing: PATIENT IS CALLING TO GET AN UPDATE ON GETTING SCHEDULED FOR SX - WANTS TO KNOW IF SHE HAS TO WAIT AFTER HER CARPAL TUNNEL SX   PLEASE ADVISE - SHE ASKED TO SPEAK WITH NOHELIA

## 2025-05-09 ENCOUNTER — HOSPITAL ENCOUNTER (OUTPATIENT)
Dept: CARDIOLOGY | Facility: HOSPITAL | Age: 65
Discharge: HOME | End: 2025-05-09
Payer: MEDICARE

## 2025-05-09 ENCOUNTER — LAB (OUTPATIENT)
Dept: LAB | Facility: HOSPITAL | Age: 65
End: 2025-05-09
Payer: MEDICARE

## 2025-05-09 ENCOUNTER — TRANSCRIBE ORDERS (OUTPATIENT)
Dept: ADMINISTRATIVE | Facility: HOSPITAL | Age: 65
End: 2025-05-09
Payer: MEDICARE

## 2025-05-09 DIAGNOSIS — Z01.818 PRE-OP TESTING: ICD-10-CM

## 2025-05-09 DIAGNOSIS — Z01.818 PRE-OP TESTING: Primary | ICD-10-CM

## 2025-05-09 LAB
ANION GAP SERPL CALCULATED.3IONS-SCNC: 8.2 MMOL/L (ref 5–15)
BUN SERPL-MCNC: 24 MG/DL (ref 8–23)
BUN/CREAT SERPL: 25.5 (ref 7–25)
CALCIUM SPEC-SCNC: 10 MG/DL (ref 8.6–10.5)
CHLORIDE SERPL-SCNC: 99 MMOL/L (ref 98–107)
CO2 SERPL-SCNC: 29.8 MMOL/L (ref 22–29)
CREAT SERPL-MCNC: 0.94 MG/DL (ref 0.57–1)
EGFRCR SERPLBLD CKD-EPI 2021: 67.5 ML/MIN/1.73
GLUCOSE SERPL-MCNC: 84 MG/DL (ref 65–99)
POTASSIUM SERPL-SCNC: 4.3 MMOL/L (ref 3.5–5.2)
SODIUM SERPL-SCNC: 137 MMOL/L (ref 136–145)

## 2025-05-09 PROCEDURE — 80048 BASIC METABOLIC PNL TOTAL CA: CPT

## 2025-05-09 PROCEDURE — 93005 ELECTROCARDIOGRAM TRACING: CPT | Performed by: PLASTIC SURGERY

## 2025-05-09 PROCEDURE — 36415 COLL VENOUS BLD VENIPUNCTURE: CPT

## 2025-05-10 LAB
QT INTERVAL: 409 MS
QTC INTERVAL: 419 MS

## 2025-05-16 ENCOUNTER — HOSPITAL ENCOUNTER (OUTPATIENT)
Facility: SURGERY CENTER | Age: 65
Setting detail: HOSPITAL OUTPATIENT SURGERY
Discharge: HOME | End: 2025-05-16
Attending: PLASTIC SURGERY | Admitting: PLASTIC SURGERY
Payer: MEDICARE

## 2025-05-16 ENCOUNTER — ANESTHESIA (OUTPATIENT)
Dept: SURGERY | Facility: SURGERY CENTER | Age: 65
End: 2025-05-16
Payer: MEDICARE

## 2025-05-16 ENCOUNTER — ANESTHESIA EVENT (OUTPATIENT)
Dept: SURGERY | Facility: SURGERY CENTER | Age: 65
End: 2025-05-16
Payer: MEDICARE

## 2025-05-16 VITALS
DIASTOLIC BLOOD PRESSURE: 71 MMHG | SYSTOLIC BLOOD PRESSURE: 134 MMHG | BODY MASS INDEX: 41.99 KG/M2 | RESPIRATION RATE: 16 BRPM | HEART RATE: 64 BPM | HEIGHT: 65 IN | WEIGHT: 252 LBS | OXYGEN SATURATION: 95 % | TEMPERATURE: 97.1 F

## 2025-05-16 PROCEDURE — 25010000002 CEFAZOLIN PER 500 MG: Performed by: PLASTIC SURGERY

## 2025-05-16 PROCEDURE — 25010000002 MIDAZOLAM PER 1 MG: Performed by: ANESTHESIOLOGY

## 2025-05-16 PROCEDURE — 25010000002 LIDOCAINE 1% - EPINEPHRINE 1:100000 1 %-1:100000 SOLUTION: Performed by: PLASTIC SURGERY

## 2025-05-16 PROCEDURE — 25010000002 FAMOTIDINE 10 MG/ML SOLUTION: Performed by: ANESTHESIOLOGY

## 2025-05-16 PROCEDURE — 25810000003 LACTATED RINGERS PER 1000 ML: Performed by: PLASTIC SURGERY

## 2025-05-16 PROCEDURE — 25010000002 LIDOCAINE 2% SOLUTION: Performed by: ANESTHESIOLOGY

## 2025-05-16 PROCEDURE — 25010000002 PROPOFOL 10 MG/ML EMULSION: Performed by: ANESTHESIOLOGY

## 2025-05-16 PROCEDURE — 64721 CARPAL TUNNEL SURGERY: CPT | Performed by: PLASTIC SURGERY

## 2025-05-16 PROCEDURE — 25010000002 FENTANYL CITRATE (PF) 50 MCG/ML SOLUTION: Performed by: ANESTHESIOLOGY

## 2025-05-16 RX ORDER — FENTANYL CITRATE 50 UG/ML
50 INJECTION, SOLUTION INTRAMUSCULAR; INTRAVENOUS ONCE AS NEEDED
Status: DISCONTINUED | OUTPATIENT
Start: 2025-05-16 | End: 2025-05-16 | Stop reason: HOSPADM

## 2025-05-16 RX ORDER — SODIUM CHLORIDE 0.9 % (FLUSH) 0.9 %
10 SYRINGE (ML) INJECTION AS NEEDED
Status: DISCONTINUED | OUTPATIENT
Start: 2025-05-16 | End: 2025-05-16 | Stop reason: HOSPADM

## 2025-05-16 RX ORDER — SODIUM CHLORIDE 0.9 % (FLUSH) 0.9 %
3-10 SYRINGE (ML) INJECTION AS NEEDED
Status: DISCONTINUED | OUTPATIENT
Start: 2025-05-16 | End: 2025-05-16 | Stop reason: HOSPADM

## 2025-05-16 RX ORDER — MIDAZOLAM HYDROCHLORIDE 1 MG/ML
0.5 INJECTION, SOLUTION INTRAMUSCULAR; INTRAVENOUS
Status: DISCONTINUED | OUTPATIENT
Start: 2025-05-16 | End: 2025-05-16 | Stop reason: HOSPADM

## 2025-05-16 RX ORDER — SODIUM CHLORIDE 0.9 % (FLUSH) 0.9 %
3 SYRINGE (ML) INJECTION EVERY 12 HOURS SCHEDULED
Status: DISCONTINUED | OUTPATIENT
Start: 2025-05-16 | End: 2025-05-16 | Stop reason: HOSPADM

## 2025-05-16 RX ORDER — MIDAZOLAM HYDROCHLORIDE 1 MG/ML
1 INJECTION, SOLUTION INTRAMUSCULAR; INTRAVENOUS
Status: DISCONTINUED | OUTPATIENT
Start: 2025-05-16 | End: 2025-05-16 | Stop reason: HOSPADM

## 2025-05-16 RX ORDER — TRAMADOL HYDROCHLORIDE 50 MG/1
50 TABLET ORAL
COMMUNITY
Start: 2025-05-15 | End: 2025-06-02

## 2025-05-16 RX ORDER — FAMOTIDINE 10 MG/ML
20 INJECTION, SOLUTION INTRAVENOUS ONCE
Status: COMPLETED | OUTPATIENT
Start: 2025-05-16 | End: 2025-05-16

## 2025-05-16 RX ORDER — FAMOTIDINE 10 MG/ML
20 INJECTION, SOLUTION INTRAVENOUS ONCE
Status: DISCONTINUED | OUTPATIENT
Start: 2025-05-16 | End: 2025-05-16 | Stop reason: HOSPADM

## 2025-05-16 RX ORDER — CEFAZOLIN SODIUM 1 G/3ML
2 INJECTION, POWDER, FOR SOLUTION INTRAMUSCULAR; INTRAVENOUS ONCE
Status: COMPLETED | OUTPATIENT
Start: 2025-05-16 | End: 2025-05-16

## 2025-05-16 RX ORDER — SODIUM CHLORIDE, SODIUM LACTATE, POTASSIUM CHLORIDE, CALCIUM CHLORIDE 600; 310; 30; 20 MG/100ML; MG/100ML; MG/100ML; MG/100ML
20 INJECTION, SOLUTION INTRAVENOUS CONTINUOUS
Status: DISCONTINUED | OUTPATIENT
Start: 2025-05-16 | End: 2025-05-16 | Stop reason: HOSPADM

## 2025-05-16 RX ORDER — FUROSEMIDE 20 MG/1
20 TABLET ORAL NIGHTLY
COMMUNITY
Start: 2025-05-07

## 2025-05-16 RX ORDER — LIDOCAINE HYDROCHLORIDE 10 MG/ML
0.5 INJECTION, SOLUTION INFILTRATION; PERINEURAL ONCE AS NEEDED
Status: DISCONTINUED | OUTPATIENT
Start: 2025-05-16 | End: 2025-05-16 | Stop reason: HOSPADM

## 2025-05-16 RX ORDER — SODIUM CHLORIDE, SODIUM LACTATE, POTASSIUM CHLORIDE, CALCIUM CHLORIDE 600; 310; 30; 20 MG/100ML; MG/100ML; MG/100ML; MG/100ML
9 INJECTION, SOLUTION INTRAVENOUS CONTINUOUS
Status: DISCONTINUED | OUTPATIENT
Start: 2025-05-16 | End: 2025-05-16 | Stop reason: HOSPADM

## 2025-05-16 RX ORDER — LIDOCAINE HYDROCHLORIDE 20 MG/ML
INJECTION, SOLUTION INFILTRATION; PERINEURAL AS NEEDED
Status: DISCONTINUED | OUTPATIENT
Start: 2025-05-16 | End: 2025-05-16 | Stop reason: SURG

## 2025-05-16 RX ORDER — LIDOCAINE HYDROCHLORIDE AND EPINEPHRINE 10; 10 MG/ML; UG/ML
INJECTION, SOLUTION INFILTRATION; PERINEURAL AS NEEDED
Status: DISCONTINUED | OUTPATIENT
Start: 2025-05-16 | End: 2025-05-16 | Stop reason: HOSPADM

## 2025-05-16 RX ORDER — FENTANYL CITRATE 50 UG/ML
50 INJECTION, SOLUTION INTRAMUSCULAR; INTRAVENOUS ONCE AS NEEDED
Status: COMPLETED | OUTPATIENT
Start: 2025-05-16 | End: 2025-05-16

## 2025-05-16 RX ADMIN — SODIUM CHLORIDE, SODIUM LACTATE, POTASSIUM CHLORIDE, AND CALCIUM CHLORIDE 20 ML/HR: 600; 310; 30; 20 INJECTION, SOLUTION INTRAVENOUS at 07:02

## 2025-05-16 RX ADMIN — MIDAZOLAM 1 MG: 1 INJECTION INTRAMUSCULAR; INTRAVENOUS at 07:50

## 2025-05-16 RX ADMIN — LIDOCAINE HYDROCHLORIDE 100 MG: 20 INJECTION, SOLUTION INFILTRATION; PERINEURAL at 08:01

## 2025-05-16 RX ADMIN — PROPOFOL 120 MCG/KG/MIN: 10 INJECTION, EMULSION INTRAVENOUS at 08:07

## 2025-05-16 RX ADMIN — CEFAZOLIN 2 G: 1 INJECTION, POWDER, FOR SOLUTION INTRAMUSCULAR; INTRAVENOUS at 07:59

## 2025-05-16 RX ADMIN — FENTANYL CITRATE 100 MCG: 50 INJECTION, SOLUTION INTRAMUSCULAR; INTRAVENOUS at 08:15

## 2025-05-16 RX ADMIN — FAMOTIDINE 20 MG: 10 INJECTION, SOLUTION INTRAVENOUS at 07:49

## 2025-05-16 NOTE — BRIEF OP NOTE
CARPAL TUNNEL RELEASE  Progress Note    Renay Urbina Case  5/16/2025    Pre-op Diagnosis:   Carpal tunnel syndrome on right [G56.01]       Post-Op Diagnosis Codes:     * Carpal tunnel syndrome on right [G56.01]    Procedure(s):      Procedure(s):  RIGHT CARPAL TUNNEL RELEASE              Surgeon(s):  Lee Iglesias MD    Anesthesia: Monitored Anesthesia Care with [facility]    Staff:   Circulator: Mara Maier RN  Scrub Person: Margarita Espinoza       Estimated Blood Loss: minimal    Urine Voided: * No values recorded between 5/16/2025  7:59 AM and 5/16/2025  8:29 AM *    Specimens:                None      Drains: * No LDAs found *    Findings: compressed median nerve      Complications: none          Lee Iglesias MD     Date: 5/16/2025  Time: 08:31 EDT

## 2025-05-16 NOTE — ANESTHESIA PREPROCEDURE EVALUATION
" Anesthesia Evaluation     Patient summary reviewed and Nursing notes reviewed   history of anesthetic complications:   NPO Solid Status: > 8 hours  NPO Liquid Status: > 2 hours           Airway   Mallampati: II  Dental - normal exam     Pulmonary - negative pulmonary ROS   Cardiovascular   Exercise tolerance: good (4-7 METS)    ECG reviewed  Rhythm: regular    (+) hypertension, hyperlipidemia    ROS comment: Sinus rhythm  Atrial premature complex  No Prior Tracing for Comparison  Electronically Signed By: Liss Ellis ([facility]) 2025-05-10 15:37:09      Neuro/Psych- negative ROS  GI/Hepatic/Renal/Endo    (+) obesity, diabetes mellitus    Musculoskeletal     Abdominal    Substance History - negative use     OB/GYN negative ob/gyn ROS         Other   arthritis,   history of cancer                  Anesthesia Plan    ASA 2     MAC     (/91 (BP Location: Left arm, Patient Position: Sitting)   Pulse 67   Temp 36.3 °C (97.3 °F) (Temporal)   Resp 16   Ht 165.1 cm (65\")   Wt 114 kg (252 lb)   LMP  (LMP Unknown)   SpO2 98%   BMI 41.93 kg/m²     I have reviewed the patient's history with the patient and the chart, including all pertinent laboratory results and imaging. I have explained the risks of anesthesia including but not limited to dental damage, corneal abrasion, nerve injury, MI, stroke, and death.    )  intravenous induction     Anesthetic plan, risks, benefits, and alternatives have been provided, discussed and informed consent has been obtained with: patient.      CODE STATUS:         "

## 2025-05-16 NOTE — H&P
[facility]   PREOPERATIVE HISTORY AND PHYSICAL    Patient Name:Renay Perez  : 1960  MRN: [id number]  Primary Care Physician: Felecia Hess APRN  Date of admission: 2025    Subjective   Subjective     Chief Complaint: preoperative evaluation    History of Present Illness  Renay Perez is a 65 y.o. female who presents for preoperative evaluation. She is scheduled for RIGHT CARPAL TUNNEL RELEASE (Right)    Review of Systems     Personal History     Past Medical History:   Diagnosis Date    Cancer     Diabetes mellitus     Hyperlipidemia     Knee swelling     PONV (postoperative nausea and vomiting)        Past Surgical History:   Procedure Laterality Date    BREAST SURGERY Left     marker put in    CHOLECYSTECTOMY      COLONOSCOPY      COLONOSCOPY N/A 10/28/2024    Procedure: COLONOSCOPY TO CECUM;  Surgeon: Aime Tsai MD;  [place] [facility] [place];  Service: Gastroenterology;  Laterality: N/A;  diverticulosis, hemorrhoids    HYSTERECTOMY         Family History: Her family history includes Anuerysm in her father; Heart disease in her father, paternal aunt, paternal grandfather, paternal uncle, and sister; Hypertension in her father.     Social History: She  reports that she has never smoked. She has never used smokeless tobacco. She reports that she does not drink alcohol and does not use drugs.    Home Medications:  Hydrocortisone (Perianal), amitriptyline, atorvastatin, glimepiride, glipizide, lisinopril, meloxicam, pioglitazone, sitaGLIPtin-metFORMIN, and triamterene-hydrochlorothiazide    Allergies:  She has no known allergies.    Objective    Objective     Vitals:         Physical Exam  Cardiovascular:      Rate and Rhythm: Normal rate.   Pulmonary:      Effort: Pulmonary effort is normal.   Musculoskeletal:         General: Normal range of motion.   Skin:     General: Skin is warm and dry.   Neurological:      General: No focal deficit present.      Mental Status: She is alert  and oriented to person, place, and time. Mental status is at baseline.   Psychiatric:         Mood and Affect: Mood normal.         Assessment & Plan   Assessment / Plan     Brief Patient Summary:  Renay Urbina Case is a 65 y.o. female who presents for preoperative evaluation.    Pre-Op Diagnosis Codes:      * Carpal tunnel syndrome on right [G56.01]    Active Hospital Problems:  There are no active hospital problems to display for this patient.    Plan:   Procedure(s):  RIGHT CARPAL TUNNEL RELEASE    The risks, benefits, and alternatives of the procedure including but not limited to infection, bleeding, failure of improvement, need for revision surgery  and risks of the anesthesia were discussed in detail with the patient and questions were answered. No guarantees were made or implied. Informed consent was obtained.    Lee Iglesias MD

## 2025-05-16 NOTE — ANESTHESIA POSTPROCEDURE EVALUATION
"Patient: Renay Urbina Case    Procedure Summary       Date: 05/16/25 Room / Location: [facility]    Anesthesia Start: 0759 Anesthesia Stop: 0832    Procedure: RIGHT CARPAL TUNNEL RELEASE (Right: Wrist) Diagnosis:       Carpal tunnel syndrome on right      (Carpal tunnel syndrome on right [G56.01])    Surgeons: Lee Iglesias MD Provider: Mara Muñoz MD    Anesthesia Type: MAC ASA Status: 2            Anesthesia Type: MAC    Vitals  Vitals Value Taken Time   /91 05/16/25 08:33   Temp 36.2 °C (97.1 °F) 05/16/25 08:34   Pulse 67 05/16/25 08:38   Resp 14 05/16/25 08:34   SpO2 95 % 05/16/25 08:38   Vitals shown include unfiled device data.        Post Anesthesia Care and Evaluation    Patient location during evaluation: bedside  Patient participation: complete - patient participated  Level of consciousness: awake  Pain management: adequate    Airway patency: patent  Anesthetic complications: No anesthetic complications  PONV Status: controlled  Cardiovascular status: acceptable  Respiratory status: acceptable  Hydration status: acceptable    Comments: /91 (BP Location: Left arm, Patient Position: Sitting)   Pulse 63   Temp 36.2 °C (97.1 °F)   Resp 14   Ht 165.1 cm (65\")   Wt 114 kg (252 lb)   LMP  (LMP Unknown)   SpO2 94%   BMI 41.93 kg/m²       "

## 2025-05-21 ENCOUNTER — TELEPHONE (OUTPATIENT)
Dept: ORTHOPEDIC SURGERY | Facility: CLINIC | Age: 65
End: 2025-05-21

## 2025-05-21 ENCOUNTER — TELEPHONE (OUTPATIENT)
Dept: CARDIOLOGY | Age: 65
End: 2025-05-21

## 2025-05-21 NOTE — TELEPHONE ENCOUNTER
REQUEST FOR CARDIAC CLEARANCE    Caller name: Renay Perez     Phone Number: [phone]    Surgeon's name: DR. CAMILA RAMSEY     Type of planned surgery: KNEE REPLACEMENT    Date of planned surgery: 06.16.2025    Type of anesthesia: GENERAL     Have you been experiencing chest pain or shortness of breath? NO    Is your doctor requesting for you to stop any of your medications prior to your surgery? NOT THAT PT IS AWARE OF    Where should we fax the clearance to? PT WAS TOLD TO HAVE SURGEON FAX OVER CARDIAC CLEARANCE FORM. WILL INCLUDE RETURN FAX NUMBER

## 2025-05-21 NOTE — TELEPHONE ENCOUNTER
Hub staff attempted to follow warm transfer process and was unsuccessful     Caller: Renay Perez    Relationship to patient: Self    Best call back number: [phone] (home)       Patient is needing: PATIENT IS SCHEDULED FOR SX 06/16/2025. HER CARIOLOGIST SAYS WE NEED TO SWEND OVER A FORM FOR CARDIAC CLEARANCE. ATTN DR JAY LANGLEY [phone]

## 2025-05-22 ENCOUNTER — TELEPHONE (OUTPATIENT)
Dept: ORTHOPEDIC SURGERY | Facility: CLINIC | Age: 65
End: 2025-05-22

## 2025-05-22 NOTE — TELEPHONE ENCOUNTER
Caller: Renay Perez    Relationship: Self    Best call back number: [phone]    What was the call regarding:PT CALLING TO REQUEST TO CLARIFY PT'S ARRIVAL TIME FOR UPCOMING R KNEE SX. PT STATES SHE WAS ADVISED TODAY TO ARRIVE AT 1:00PM, HOWEVER HER PAPERWORK STATES TO ARRIVE AT 9:00AM. PLEASE CONFIRM ARRIVAL TIME WITH PT.    ADDITIONALLY, PT WAS RECENTLY PRESCRIBED MONJARO FROM PCP. PT WOULD LIKE TO KNOW IF IT IS OK TO START TAKING MONJARO PRIOR TO SX.     PLEASE CONTACT PT TO DISCUSS THE ISSUES ABOVE.

## 2025-05-23 NOTE — TELEPHONE ENCOUNTER
Spoke to patient and relayed arrival time fluctuates until closer to surgery when schedule is set. Patient will get a call the Friday before to confirm arrival time. Patient verbalized understanding.     Regarding Barbra, I relayed instructions available on the pre-op packet. Barbra held for 7 days prior to surgery. Patient verbalized understanding.

## 2025-06-02 ENCOUNTER — PRE-ADMISSION TESTING (OUTPATIENT)
Dept: PREADMISSION TESTING | Facility: HOSPITAL | Age: 65
End: 2025-06-02
Payer: MEDICARE

## 2025-06-02 VITALS
HEART RATE: 66 BPM | OXYGEN SATURATION: 97 % | TEMPERATURE: 98.4 F | SYSTOLIC BLOOD PRESSURE: 134 MMHG | DIASTOLIC BLOOD PRESSURE: 73 MMHG | HEIGHT: 63 IN | BODY MASS INDEX: 44.33 KG/M2 | RESPIRATION RATE: 16 BRPM | WEIGHT: 250.2 LBS

## 2025-06-02 LAB
ANION GAP SERPL CALCULATED.3IONS-SCNC: 9.6 MMOL/L (ref 5–15)
BUN SERPL-MCNC: 24 MG/DL (ref 8–23)
BUN/CREAT SERPL: 25.8 (ref 7–25)
CALCIUM SPEC-SCNC: 10.1 MG/DL (ref 8.6–10.5)
CHLORIDE SERPL-SCNC: 100 MMOL/L (ref 98–107)
CO2 SERPL-SCNC: 27.4 MMOL/L (ref 22–29)
CREAT SERPL-MCNC: 0.93 MG/DL (ref 0.57–1)
DEPRECATED RDW RBC AUTO: 45.2 FL (ref 37–54)
EGFRCR SERPLBLD CKD-EPI 2021: 68.3 ML/MIN/1.73
ERYTHROCYTE [DISTWIDTH] IN BLOOD BY AUTOMATED COUNT: 14.1 % (ref 12.3–15.4)
GLUCOSE SERPL-MCNC: 101 MG/DL (ref 65–99)
HCT VFR BLD AUTO: 34.6 % (ref 34–46.6)
HGB BLD-MCNC: 11.2 G/DL (ref 12–15.9)
MCH RBC QN AUTO: 29 PG (ref 26.6–33)
MCHC RBC AUTO-ENTMCNC: 32.4 G/DL (ref 31.5–35.7)
MCV RBC AUTO: 89.6 FL (ref 79–97)
PLATELET # BLD AUTO: 314 10*3/MM3 (ref 140–450)
PMV BLD AUTO: 9.1 FL (ref 6–12)
POTASSIUM SERPL-SCNC: 4.3 MMOL/L (ref 3.5–5.2)
RBC # BLD AUTO: 3.86 10*6/MM3 (ref 3.77–5.28)
SODIUM SERPL-SCNC: 137 MMOL/L (ref 136–145)
WBC NRBC COR # BLD AUTO: 5.79 10*3/MM3 (ref 3.4–10.8)

## 2025-06-02 PROCEDURE — 36415 COLL VENOUS BLD VENIPUNCTURE: CPT

## 2025-06-02 PROCEDURE — 80048 BASIC METABOLIC PNL TOTAL CA: CPT

## 2025-06-02 PROCEDURE — 85027 COMPLETE CBC AUTOMATED: CPT

## 2025-06-02 NOTE — DISCHARGE INSTRUCTIONS
Take the following medications the morning of surgery: NO MEDS AM OF SURGERY      If you are on an Aspirin or a Blood Thinner please clarify with the surgeon and prescribing physician if and when you are to hold the medication or if you are to continue the medication.  If you are on prescription narcotic pain medication to control your pain you may also take that medication the morning of surgery.      General Instructions:     Do not eat solid food after midnight the night before surgery.  Clear liquids day of surgery are allowed but must be stopped at least two hours before your hospital arrival time.       Allowed clear liquids      Water, sodas, and tea or coffee with no cream or milk added.       12 to 20 ounces of a clear liquid that contains carbohydrates is recommended.  If non-diabetic, have Gatorade or Powerade.  If diabetic, have G2 or Powerade Zero.     Do not have liquids red in color.  Do not consume chicken, beef, pork or vegetable broth or bouillon cubes of any variety as they are not considered clear liquids and are not allowed.  Patients who avoid smoking, chewing tobacco and alcohol for 4 weeks prior to surgery have a reduced risk of post-operative complications.  Quit smoking as many days before surgery as you can.  Do not smoke, use chewing tobacco or drink alcohol the day of surgery.   If applicable bring your C-PAP/ BI-PAP machine in with you to preop day of surgery.  Bring any papers given to you in the doctor’s office.  Wear clean comfortable clothes.  Do not wear contact lenses, false eyelashes or make-up.  Bring a case for your glasses.   Bring crutches or walker if applicable.  Remove all piercings.  Leave jewelry and any other valuables at home.  Hair extensions with metal clips must be removed prior to surgery.  The Pre-Admission Testing nurse will instruct you to bring medications if unable to obtain an accurate list in Pre-Admission Testing.    Day of surgery you will need to let the  preoperative nurse know the last time you took each of your medications.  To ensure a safe environment for patients and staff, we kindly ask that children under the age of 16 not accompany patients.  If you must bring a dependent child or dependent adult please ensure a responsible adult, other than yourself, is present to supervise them.      If you were given a blood bank ID arm band remember to bring it with you the day of surgery.    Preventing a Surgical Site Infection:  For 2 to 3 days before surgery, avoid shaving with a razor because the razor can irritate skin and make it easier to develop an infection.    Any areas of open skin can increase the risk of a post-operative wound infection by allowing bacteria to enter and travel throughout the body.  Notify your surgeon if you have any skin wounds / rashes even if it is not near the expected surgical site.  The area will need assessed to determine if surgery should be delayed until it is healed.  The night prior to surgery shower using a fresh bar of anti-bacterial soap (such as Dial) and clean washcloth.  Sleep in a clean bed with clean clothing.  Do not allow pets to sleep with you.  Shower on the morning of surgery using a fresh bar of anti-bacterial soap (such as Dial) and clean washcloth.  Dry with a clean towel and dress in clean clothing.  Ask your surgeon if you will be receiving antibiotics prior to surgery.  Make sure you, your family, and all healthcare providers clean their hands with soap and water or an alcohol based hand  before caring for you or your wound.  CHLORHEXIDINE CLOTH INSTRUCTIONS  The morning of surgery follow these instructions using the Chlorhexidine cloths you've been given.  These steps reduce bacteria on the body.  Do not use the cloths near your eyes, ears mouth, genitalia or on open wounds.  Throw the cloths away after use but do not try to flush them down a toilet.      Open and remove one cloth at a time from the  package.    Leave the cloth unfolded and begin the bathing.  Massage the skin with the cloths using gentle pressure to remove bacteria.  Do not scrub harshly.   Follow the steps below with one 2% CHG cloth per area (6 total cloths).  One cloth for neck, shoulders and chest.  One cloth for both arms, hands, fingers and underarms (do underarms last).  One cloth for the abdomen followed by groin.  One cloth for right leg and foot including between the toes.  One cloth for left leg and foot including between the toes.  The last cloth is to be used for the back of the neck, back and buttocks.    Allow the CHG to air dry 3 minutes on the skin which will give it time to work and decrease the chance of irritation.  The skin may feel sticky until it is dry.  Do not rinse with water or any other liquid or you will lose the beneficial effects of the CHG.  If mild skin irritation occurs, do rinse the skin to remove the CHG.  Report this to the nurse at time of admission.  Do not apply lotions, creams, ointments, deodorants or perfumes after using the clothes. Dress in clean clothes before coming to the hospital.    Day of surgery:  Your arrival time is approximately two hours before your scheduled surgery time.  Please note if you have an early arrival time the surgery doors do not open before 5:00 AM.  Upon arrival, a Pre-op nurse and Anesthesiologist will review your health history, obtain vital signs, and answer questions you may have.  The only belongings needed at this time will be a list of your home medications and if applicable your C-PAP/BI-PAP machine.  A Pre-op nurse will start an IV and you may receive medication in preparation for surgery, including something to help you relax.     Please be aware that surgery does come with discomfort.  We want to make every effort to control your discomfort so please discuss any uncontrolled symptoms with your nurse.   Your doctor will most likely have prescribed pain  medications.      If you are going home after surgery you will receive individualized written care instructions before being discharged.  A responsible adult must drive you to and from the hospital on the day of your surgery and ideally stay with you through the night.   .  Discharge prescriptions can be filled by the hospital pharmacy during regular pharmacy hours.  If you are having surgery late in the day/evening your prescription may be e-prescribed to your pharmacy.  Please verify your pharmacy hours or chose a 24 hour pharmacy to avoid not having access to your prescription because your pharmacy has closed for the day.    If you are staying overnight following surgery, you will be transported to your hospital room following the recovery period.  [facility] has all private rooms.    If you have any questions please call Pre-Admission Testing at [phone].  Deductibles and co-payments are collected on the day of service. Please be prepared to pay the required co-pay, deductible or deposit on the day of service as defined by your plan.    Call your surgeon immediately if you experience any of the following symptoms:  Sore Throat  Shortness of Breath or difficulty breathing  Cough  Chills  Body soreness or muscle pain  Headache  Fever  New loss of taste or smell  Do not arrive for your surgery ill.  Your procedure will need to be rescheduled to another time.  You will need to call your physician before the day of surgery to avoid any unnecessary exposure to hospital staff as well as other patients.

## 2025-06-05 ENCOUNTER — TELEPHONE (OUTPATIENT)
Dept: ORTHOPEDIC SURGERY | Facility: HOSPITAL | Age: 65
End: 2025-06-05
Payer: MEDICARE

## 2025-06-05 ENCOUNTER — TELEPHONE (OUTPATIENT)
Dept: ORTHOPEDIC SURGERY | Facility: CLINIC | Age: 65
End: 2025-06-05

## 2025-06-05 NOTE — TELEPHONE ENCOUNTER
Caller: Renay Perez    Relationship to patient: Self    Best call back number: [phone]    Patient is needing: PATIENT WANTS ORDER FOR HOME HEALTH. SHE IS SET UP FOR OUTPATIENT PT TO START ON 6/23/25.   SHE IS SCHEDULED FOR SX 6/16/25  PLEASE ADVISE PATIENT ONCE SENT

## 2025-06-05 NOTE — TELEPHONE ENCOUNTER
Risk Factor yes no   Age >75  X   BMI <20 >40 X    Patient History     Chronic Pain (2 or more meds/Pain Management)  X   ETOH (more than 3 drinks Daily)  X   Uncontrolled Depression/Bipolar/Schizoaffective Disorder  X   Arrhythmias--  X   Stent placement/MI  X   DVT/PE  X   Pacemaker  X   HTN (uncontrolled or requiring more than 2 medications) X    CHF/Retained fluids/Edema X    Stroke with Residual   X   COPD/Asthma X    FERNANDO--Non-compliant with CPAP  X   Diabetes (on insulin or more than 2 meds)         A1C:  X   BPH/Urinary retention (on medication)  X   CKD  X   Home environment and support     Current ambulation status (use of cane, walker, W/C, Multiple falls/weakness)  X   Stairs to enter and throughout home X    Lives Alone  X   Doesn't have support at home  X   Outpatient Screening Assessment    Home needs: (Walker/BSC):  Has walker  ? Steps 3 steps in, does have multiple levels but BR is on main floor  Caregiver 24-48hrs post-discharge:      Discharge Plan:   [facility]--Already has OP set up with [facility]    Prescriptions: Meds to Bed     Home medications:   [] Blood thinner/anti-coag therapy--   ? BPH or diuretic-- Lasix  ? BP meds-- Lisinopril, Maxide   ? Pain/Anti-inflammatories--Mobic  DM--Janumet, Actos    Pre-op Education:  Educate patient on spinal anesthesia/pain control:  ? patient verbalize understanding  Educate patient on hospital course/timeline:  ?  patient verbalize understanding    Joint Care Class:  ?  yes [] no  Notes:   PONV

## 2025-06-10 ENCOUNTER — OFFICE VISIT (OUTPATIENT)
Dept: ORTHOPEDIC SURGERY | Facility: CLINIC | Age: 65
End: 2025-06-10
Payer: MEDICARE

## 2025-06-10 VITALS
OXYGEN SATURATION: 98 % | HEART RATE: 68 BPM | HEIGHT: 65 IN | BODY MASS INDEX: 41.3 KG/M2 | SYSTOLIC BLOOD PRESSURE: 138 MMHG | DIASTOLIC BLOOD PRESSURE: 81 MMHG | TEMPERATURE: 97.8 F | WEIGHT: 247.9 LBS

## 2025-06-10 DIAGNOSIS — R52 PAIN: Primary | ICD-10-CM

## 2025-06-10 NOTE — H&P (VIEW-ONLY)
Patient: Renay Urbina Case    Date of Admission: 6/16/2025    YOB: 1960    Medical Record Number: [id number]    Admitting Physician: Dr. Ty Wheeler    Reason for Admission: End Stage Right Knee OA    History of Present Illness: 65 y.o. female presents with severe end stage knee osteoarthritis which has not been responsive to the full compliment of conservative measures. Despite conservative attempts, there is still severe, constant activity-limiting pain. Given the severity of the pain, the patient has elected to proceed with knee replacement.    Allergies: No Known Allergies      Current Medications:  Home Medications:    Current Outpatient Medications on File Prior to Visit   Medication Sig    amitriptyline (ELAVIL) 10 MG tablet Take 1 tablet by mouth Every Night. SLEEP    atorvastatin (LIPITOR) 10 MG tablet Take 1 tablet by mouth Daily.    furosemide (LASIX) 20 MG tablet Take 1 tablet by mouth Every Night.    Janumet  MG per tablet Take 1 tablet by mouth 2 (Two) Times a Day With Meals.    lisinopril (PRINIVIL,ZESTRIL) 10 MG tablet Take 1 tablet by mouth Daily.    meloxicam (MOBIC) 7.5 MG tablet Take 1 tablet by mouth once daily (Patient taking differently: Take 1 tablet by mouth Daily. HOLD ONE WEEK FOR SURGERY)    pioglitazone (ACTOS) 15 MG tablet Take 1 tablet by mouth Every Night.    triamterene-hydrochlorothiazide (Maxzide-25) 37.5-25 MG per tablet Take 1 tablet by mouth Every Night.     No current facility-administered medications on file prior to visit.     PRN Meds:.    PMH:     Past Medical History:   Diagnosis Date    Cellulitis     FROM PEDICURE AT LEAST 20 YEARS    Constipation     Diabetes mellitus     PREDIABTIC    H/O hand surgery     RIGHT CARPAL TUNNEL SURGERY MAY 2025, STERISTRIPS NOTED IN PAT, [facility]    History of COPD     Hyperlipidemia     Hypertension     Irregular heart beat     EXTRA BEAT    Knee swelling     Lymphedema     LEFT LEG/ SEES SPECIALIST    PONV (postoperative  "nausea and vomiting)     Sleep apnea     USES CPAP    Trouble in sleeping     Uterine cancer        PF/Surg/Soc Hx:     Past Surgical History:   Procedure Laterality Date    BREAST SURGERY Left     marker put in    CARPAL TUNNEL RELEASE Right 05/16/2025    Procedure: RIGHT CARPAL TUNNEL RELEASE;  Surgeon: Lee Iglesias MD;  Location: [facility] [place];  Service: Hand;  Laterality: Right;    CHOLECYSTECTOMY      COLONOSCOPY  2010    COLONOSCOPY N/A 10/28/2024    Procedure: COLONOSCOPY TO CECUM;  Surgeon: Aime Tsai MD;  Location: [facility] [place] [facility] [place];  Service: Gastroenterology;  Laterality: N/A;  diverticulosis, hemorrhoids    HYSTERECTOMY      UTERINE MARKER PLACED, BILATERAL TUBES AND OVARIES REMOVED AS WELL        Social History     Occupational History    Not on file   Tobacco Use    Smoking status: Never    Smokeless tobacco: Never   Vaping Use    Vaping status: Never Used   Substance and Sexual Activity    Alcohol use: No    Drug use: No    Sexual activity: Yes     Partners: Male      Social History     Social History Narrative    Not on file        Family History   Problem Relation Age of Onset    Hypertension Father     Heart disease Father     Anuerysm Father     Heart disease Sister     Heart disease Paternal Aunt     Heart disease Paternal Uncle     Heart disease Paternal Grandfather     Colon cancer Neg Hx     Colon polyps Neg Hx     Crohn's disease Neg Hx     Irritable bowel syndrome Neg Hx     Ulcerative colitis Neg Hx     Malig Hyperthermia Neg Hx          Review of Systems:   A 14 point review of systems was performed, pertinent positives discussed above, all other systems are negative    Physical Exam: 65 y.o. female  Vital Signs :   Vitals:    06/10/25 0846   BP: 138/81   Pulse: 68   Temp: 97.8 °F (36.6 °C)   SpO2: 98%   Weight: 112 kg (247 lb 14.4 oz)   Height: 165.1 cm (65\")     General: Alert and Oriented x 3, No acute distress.  Psych: mood and affect appropriate; recent and remote memory " intact  Eyes: conjunctivae clear; pupils equally round and reactive, sclerae anicteric  CV: RRR  Resp: normal respiratory effort  Skin: no rashes or wounds; normal turgor      Xrays:  -3 views (AP, lateral, and sunrise) were ordered and reviewed demonstrating end-stage OA with bone on bone articulation.  -A full length AP xray was ordered and reviewed today for purposes of operative alignment demonstrating end stage arthritic findings. There are no previous full length films for review    Assessment:  End-stage Right knee osteoarthritis. Conservative measures have failed.      Plan:  The plan is to proceed with Right Total Knee Replacement. The patient voiced understanding of the risks, benefits, and alternative forms of treatment that were discussed with Dr Wheeler at the time of scheduling.  Same day [facility]    Yumi Reyes, LUIGI  6/10/2025

## 2025-06-10 NOTE — PROGRESS NOTES
"Patient: Renay Perez  YOB: 1960 65 y.o. female  Medical Record Number: [id number]    Chief Complaints:   Chief Complaint   Patient presents with    Right Knee - Pre-op Exam       History of Present Illness:Renay Urbina Case is a 65 y.o. female who presents     Allergies: No Known Allergies    Medications:   Current Outpatient Medications   Medication Sig Dispense Refill    amitriptyline (ELAVIL) 10 MG tablet Take 1 tablet by mouth Every Night. SLEEP      atorvastatin (LIPITOR) 10 MG tablet Take 1 tablet by mouth Daily.      furosemide (LASIX) 20 MG tablet Take 1 tablet by mouth Every Night.      Janumet  MG per tablet Take 1 tablet by mouth 2 (Two) Times a Day With Meals.      lisinopril (PRINIVIL,ZESTRIL) 10 MG tablet Take 1 tablet by mouth Daily.      meloxicam (MOBIC) 7.5 MG tablet Take 1 tablet by mouth once daily (Patient taking differently: Take 1 tablet by mouth Daily. HOLD ONE WEEK FOR SURGERY) 30 tablet 0    pioglitazone (ACTOS) 15 MG tablet Take 1 tablet by mouth Every Night.      triamterene-hydrochlorothiazide (Maxzide-25) 37.5-25 MG per tablet Take 1 tablet by mouth Every Night.       No current facility-administered medications for this visit.         The following portions of the patient's history were reviewed and updated as appropriate: allergies, current medications, past family history, past medical history, past social history, past surgical history and problem list.    Review of Systems:   14 point review of systems was performed. All systems negative except pertinent positives/negatives listed in HPI above    Physical Exam:   Vitals:    06/10/25 0846   BP: 138/81   Pulse: 68   Temp: 97.8 °F (36.6 °C)   SpO2: 98%   Weight: 112 kg (247 lb 14.4 oz)   Height: 165.1 cm (65\")       General: A and O x 3, ASA, NAD          Radiology:  Xrays 3views (ap,lateral, sunrise) {ordered / reviewed knee:14842} demonstrating {xray findings knee:52514}    Assessment/Plan: ***      Yumi MUNOZ" LUIGI Reyes  6/10/2025

## 2025-06-10 NOTE — H&P
Patient: Renay Urbina Case    Date of Admission: 6/16/2025    YOB: 1960    Medical Record Number: [id number]    Admitting Physician: Dr. Ty Wheeelr    Reason for Admission: End Stage Right Knee OA    History of Present Illness: 65 y.o. female presents with severe end stage knee osteoarthritis which has not been responsive to the full compliment of conservative measures. Despite conservative attempts, there is still severe, constant activity-limiting pain. Given the severity of the pain, the patient has elected to proceed with knee replacement.    Allergies: No Known Allergies      Current Medications:  Home Medications:    Current Outpatient Medications on File Prior to Visit   Medication Sig    amitriptyline (ELAVIL) 10 MG tablet Take 1 tablet by mouth Every Night. SLEEP    atorvastatin (LIPITOR) 10 MG tablet Take 1 tablet by mouth Daily.    furosemide (LASIX) 20 MG tablet Take 1 tablet by mouth Every Night.    Janumet  MG per tablet Take 1 tablet by mouth 2 (Two) Times a Day With Meals.    lisinopril (PRINIVIL,ZESTRIL) 10 MG tablet Take 1 tablet by mouth Daily.    meloxicam (MOBIC) 7.5 MG tablet Take 1 tablet by mouth once daily (Patient taking differently: Take 1 tablet by mouth Daily. HOLD ONE WEEK FOR SURGERY)    pioglitazone (ACTOS) 15 MG tablet Take 1 tablet by mouth Every Night.    triamterene-hydrochlorothiazide (Maxzide-25) 37.5-25 MG per tablet Take 1 tablet by mouth Every Night.     No current facility-administered medications on file prior to visit.     PRN Meds:.    PMH:     Past Medical History:   Diagnosis Date    Cellulitis     FROM PEDICURE AT LEAST 20 YEARS    Constipation     Diabetes mellitus     PREDIABTIC    H/O hand surgery     RIGHT CARPAL TUNNEL SURGERY MAY 2025, STERISTRIPS NOTED IN PAT, CDI    History of COPD     Hyperlipidemia     Hypertension     Irregular heart beat     EXTRA BEAT    Knee swelling     Lymphedema     LEFT LEG/ SEES SPECIALIST    PONV (postoperative  "nausea and vomiting)     Sleep apnea     USES CPAP    Trouble in sleeping     Uterine cancer        PF/Surg/Soc Hx:     Past Surgical History:   Procedure Laterality Date    BREAST SURGERY Left     marker put in    CARPAL TUNNEL RELEASE Right 05/16/2025    Procedure: RIGHT CARPAL TUNNEL RELEASE;  Surgeon: Lee Iglesias MD;  Location: [facility] MAIN OR;  Service: Hand;  Laterality: Right;    CHOLECYSTECTOMY      COLONOSCOPY  2010    COLONOSCOPY N/A 10/28/2024    Procedure: COLONOSCOPY TO CECUM;  Surgeon: Aime Tsai MD;  Location: [facility] MAIN OR;  Service: Gastroenterology;  Laterality: N/A;  diverticulosis, hemorrhoids    HYSTERECTOMY      UTERINE MARKER PLACED, BILATERAL TUBES AND OVARIES REMOVED AS WELL        Social History     Occupational History    Not on file   Tobacco Use    Smoking status: Never    Smokeless tobacco: Never   Vaping Use    Vaping status: Never Used   Substance and Sexual Activity    Alcohol use: No    Drug use: No    Sexual activity: Yes     Partners: Male      Social History     Social History Narrative    Not on file        Family History   Problem Relation Age of Onset    Hypertension Father     Heart disease Father     Anuerysm Father     Heart disease Sister     Heart disease Paternal Aunt     Heart disease Paternal Uncle     Heart disease Paternal Grandfather     Colon cancer Neg Hx     Colon polyps Neg Hx     Crohn's disease Neg Hx     Irritable bowel syndrome Neg Hx     Ulcerative colitis Neg Hx     Malig Hyperthermia Neg Hx          Review of Systems:   A 14 point review of systems was performed, pertinent positives discussed above, all other systems are negative    Physical Exam: 65 y.o. female  Vital Signs :   Vitals:    06/10/25 0846   BP: 138/81   Pulse: 68   Temp: 97.8 °F (36.6 °C)   SpO2: 98%   Weight: 112 kg (247 lb 14.4 oz)   Height: 165.1 cm (65\")     General: Alert and Oriented x 3, No acute distress.  Psych: mood and affect appropriate; recent and remote memory " intact  Eyes: conjunctivae clear; pupils equally round and reactive, sclerae anicteric  CV: RRR  Resp: normal respiratory effort  Skin: no rashes or wounds; normal turgor      Xrays:  -3 views (AP, lateral, and sunrise) were ordered and reviewed demonstrating end-stage OA with bone on bone articulation.  -A full length AP xray was ordered and reviewed today for purposes of operative alignment demonstrating end stage arthritic findings. There are no previous full length films for review    Assessment:  End-stage Right knee osteoarthritis. Conservative measures have failed.      Plan:  The plan is to proceed with Right Total Knee Replacement. The patient voiced understanding of the risks, benefits, and alternative forms of treatment that were discussed with Dr Wheeler at the time of scheduling.  Same day [facility]    Yumi Reyes, LUIGI  6/10/2025

## 2025-06-13 ENCOUNTER — TELEPHONE (OUTPATIENT)
Dept: ORTHOPEDIC SURGERY | Facility: CLINIC | Age: 65
End: 2025-06-13
Payer: MEDICARE

## 2025-06-13 ENCOUNTER — TELEPHONE (OUTPATIENT)
Dept: ORTHOPEDIC SURGERY | Facility: CLINIC | Age: 65
End: 2025-06-13

## 2025-06-13 NOTE — TELEPHONE ENCOUNTER
Left voicemail for patient with arrival time for her procedure on 6/16/25, arrival time being @ 9:00 am.

## 2025-06-13 NOTE — TELEPHONE ENCOUNTER
As long as the cough is mild and she does not feel sick or have a fever, should be okay to proceed.  If her symptoms worsen would definitely recommend going to urgent care or seeing her PCP and would need to hold off on surgery until her illness is treated

## 2025-06-13 NOTE — TELEPHONE ENCOUNTER
[facility]    [place] Discharge Summary    Jeff Dewey Patient Status:  [place]    2021 MRN [id number]   Location [facility]  3SE-N Attending Silvestre Nation MD   Hosp Day # 1 PCP   No primary care provider on file.      Date o Caller: Chris Renayerica Urbina    Relationship to patient: Self    Best call back number: [phone]*    Patient is needing: PT IS CALLING TO SPEAK WITH SOMEONE REGARDING HER SX ON 06/16/25.. PT STATES SHE HAS A LITTLE COUGH AT THE MOMENT.. PLEASE ADVISE..         Neck is supple without adenopathy  Respiratory: Normal to inspection; normal respiratory effort; lungs are clear to auscultation  Cardiovascular: Regular rate and rhythm; no murmurs  Vascular: Normal radial and femoral pulses; normal capillary refill  Abdo

## 2025-06-16 ENCOUNTER — HOSPITAL ENCOUNTER (OUTPATIENT)
Facility: HOSPITAL | Age: 65
Setting detail: HOSPITAL OUTPATIENT SURGERY
Discharge: HOME HEALTH | End: 2025-06-16
Attending: ORTHOPAEDIC SURGERY | Admitting: ORTHOPAEDIC SURGERY
Payer: MEDICARE

## 2025-06-16 ENCOUNTER — ANESTHESIA (OUTPATIENT)
Dept: PERIOP | Facility: HOSPITAL | Age: 65
End: 2025-06-16
Payer: MEDICARE

## 2025-06-16 ENCOUNTER — APPOINTMENT (OUTPATIENT)
Dept: GENERAL RADIOLOGY | Facility: HOSPITAL | Age: 65
End: 2025-06-16
Payer: MEDICARE

## 2025-06-16 ENCOUNTER — ANESTHESIA EVENT (OUTPATIENT)
Dept: PERIOP | Facility: HOSPITAL | Age: 65
End: 2025-06-16
Payer: MEDICARE

## 2025-06-16 VITALS
DIASTOLIC BLOOD PRESSURE: 92 MMHG | SYSTOLIC BLOOD PRESSURE: 142 MMHG | RESPIRATION RATE: 16 BRPM | HEART RATE: 56 BPM | OXYGEN SATURATION: 98 % | TEMPERATURE: 98 F

## 2025-06-16 DIAGNOSIS — M17.11 PRIMARY OSTEOARTHRITIS OF RIGHT KNEE: ICD-10-CM

## 2025-06-16 DIAGNOSIS — Z96.651 S/P TOTAL KNEE REPLACEMENT, RIGHT: Primary | ICD-10-CM

## 2025-06-16 LAB — GLUCOSE BLDC GLUCOMTR-MCNC: 113 MG/DL (ref 70–130)

## 2025-06-16 PROCEDURE — C1713 ANCHOR/SCREW BN/BN,TIS/BN: HCPCS | Performed by: ORTHOPAEDIC SURGERY

## 2025-06-16 PROCEDURE — 25010000002 MIDAZOLAM PER 1 MG: Performed by: ANESTHESIOLOGY

## 2025-06-16 PROCEDURE — 25010000002 FENTANYL CITRATE (PF) 50 MCG/ML SOLUTION

## 2025-06-16 PROCEDURE — 25010000002 ROPIVACAINE PER 1 MG: Performed by: ORTHOPAEDIC SURGERY

## 2025-06-16 PROCEDURE — 73560 X-RAY EXAM OF KNEE 1 OR 2: CPT

## 2025-06-16 PROCEDURE — 97162 PT EVAL MOD COMPLEX 30 MIN: CPT

## 2025-06-16 PROCEDURE — 25010000002 ONDANSETRON PER 1 MG

## 2025-06-16 PROCEDURE — 25010000002 MORPHINE PER 10 MG: Performed by: ORTHOPAEDIC SURGERY

## 2025-06-16 PROCEDURE — C1776 JOINT DEVICE (IMPLANTABLE): HCPCS | Performed by: ORTHOPAEDIC SURGERY

## 2025-06-16 PROCEDURE — 25010000002 EPINEPHRINE 1 MG/ML SOLUTION 30 ML VIAL: Performed by: ORTHOPAEDIC SURGERY

## 2025-06-16 PROCEDURE — 25810000003 SODIUM CHLORIDE 0.9 % SOLUTION: Performed by: NURSE PRACTITIONER

## 2025-06-16 PROCEDURE — 25010000002 ROPIVACAINE PER 1 MG: Performed by: ANESTHESIOLOGY

## 2025-06-16 PROCEDURE — 25810000003 LACTATED RINGERS SOLUTION: Performed by: NURSE PRACTITIONER

## 2025-06-16 PROCEDURE — 82948 REAGENT STRIP/BLOOD GLUCOSE: CPT

## 2025-06-16 PROCEDURE — 25010000002 GLYCOPYRROLATE 0.2 MG/ML SOLUTION

## 2025-06-16 PROCEDURE — 25010000002 FENTANYL CITRATE (PF) 50 MCG/ML SOLUTION: Performed by: ANESTHESIOLOGY

## 2025-06-16 PROCEDURE — 25810000003 LACTATED RINGERS PER 1000 ML: Performed by: ANESTHESIOLOGY

## 2025-06-16 PROCEDURE — 25010000002 ACETAMINOPHEN 10 MG/ML SOLUTION

## 2025-06-16 PROCEDURE — 25010000002 VANCOMYCIN 10 G RECONSTITUTED SOLUTION: Performed by: NURSE PRACTITIONER

## 2025-06-16 PROCEDURE — 27447 TOTAL KNEE ARTHROPLASTY: CPT | Performed by: ORTHOPAEDIC SURGERY

## 2025-06-16 PROCEDURE — 25010000002 DEXAMETHASONE PER 1 MG: Performed by: ANESTHESIOLOGY

## 2025-06-16 PROCEDURE — 25010000002 FAMOTIDINE 10 MG/ML SOLUTION: Performed by: ANESTHESIOLOGY

## 2025-06-16 PROCEDURE — 25010000002 HYDROMORPHONE 1 MG/ML SOLUTION

## 2025-06-16 PROCEDURE — 25010000002 DEXAMETHASONE SODIUM PHOSPHATE 20 MG/5ML SOLUTION

## 2025-06-16 PROCEDURE — 97530 THERAPEUTIC ACTIVITIES: CPT

## 2025-06-16 PROCEDURE — 25010000002 PROPOFOL 10 MG/ML EMULSION

## 2025-06-16 PROCEDURE — 25010000002 CEFAZOLIN PER 500 MG: Performed by: NURSE PRACTITIONER

## 2025-06-16 PROCEDURE — 25010000002 SUGAMMADEX 200 MG/2ML SOLUTION

## 2025-06-16 PROCEDURE — 25010000002 KETOROLAC TROMETHAMINE PER 15 MG: Performed by: ORTHOPAEDIC SURGERY

## 2025-06-16 PROCEDURE — 25010000002 LIDOCAINE 2% SOLUTION

## 2025-06-16 DEVICE — GENESIS II BICONVEX PATELLA 29MM
Type: IMPLANTABLE DEVICE | Site: KNEE | Status: FUNCTIONAL
Brand: GENESIS II

## 2025-06-16 DEVICE — LEGION POSTERIOR STABILIZED                                    OXINIUM FEMORAL SIZE 4 RIGHT
Type: IMPLANTABLE DEVICE | Site: KNEE | Status: FUNCTIONAL
Brand: LEGION

## 2025-06-16 DEVICE — DEV CONTRL TISS STRATAFIX SYMM PDS PLUS VIL CT-1 60CM: Type: IMPLANTABLE DEVICE | Site: KNEE | Status: FUNCTIONAL

## 2025-06-16 DEVICE — GENESIS II NON-POROUS TIBIAL                                    BASEPLATE SIZE 3 RIGHT
Type: IMPLANTABLE DEVICE | Site: KNEE | Status: FUNCTIONAL
Brand: GENESIS II

## 2025-06-16 DEVICE — PALACOS® R IS A FAST-CURING, RADIOPAQUE, POLY(METHYL METHACRYLATE)-BASED BONE CEMENT.PALACOS ® R CONTAINS THE X-RAY CONTRAST MEDIUM ZIRCONIUM DIOXIDE. TO IMPROVE VISIBILITY IN THE SURGICAL FIELD PALACOS ® R HAS BEEN COLOURED WITH CHLOROPHYLL (E141). THE BONE CEMENT IS PREPARED DIRECTLY BEFORE USE BY MIXING A POLYMER POWDER COMPONENT WITH A LIQUID MONOMER COMPONENT. A DUCTILE DOUGH FORMS WHICH CURES WITHIN A FEW MINUTES.
Type: IMPLANTABLE DEVICE | Site: KNEE | Status: FUNCTIONAL
Brand: PALACOS®

## 2025-06-16 DEVICE — IMPLANTABLE DEVICE: Type: IMPLANTABLE DEVICE | Status: FUNCTIONAL

## 2025-06-16 DEVICE — DEV CONTRL TISS STRATAFIXSPIRALMNCRYL PLSPS2 REV3/0 45CM: Type: IMPLANTABLE DEVICE | Site: KNEE | Status: FUNCTIONAL

## 2025-06-16 DEVICE — LEGION POSTERIOR STABILIZED HIGH                                    FLEX HIGHLY CROSS LINKED                                    POLYETHYLENE SIZE 3-4 11MM
Type: IMPLANTABLE DEVICE | Site: KNEE | Status: FUNCTIONAL
Brand: LEGION

## 2025-06-16 RX ORDER — LIDOCAINE HYDROCHLORIDE 20 MG/ML
INJECTION, SOLUTION INFILTRATION; PERINEURAL AS NEEDED
Status: DISCONTINUED | OUTPATIENT
Start: 2025-06-16 | End: 2025-06-16 | Stop reason: SURG

## 2025-06-16 RX ORDER — SODIUM CHLORIDE 0.9 % (FLUSH) 0.9 %
3 SYRINGE (ML) INJECTION EVERY 12 HOURS SCHEDULED
Status: DISCONTINUED | OUTPATIENT
Start: 2025-06-16 | End: 2025-06-16 | Stop reason: HOSPADM

## 2025-06-16 RX ORDER — PROMETHAZINE HYDROCHLORIDE 25 MG/1
25 TABLET ORAL ONCE AS NEEDED
Status: DISCONTINUED | OUTPATIENT
Start: 2025-06-16 | End: 2025-06-16 | Stop reason: HOSPADM

## 2025-06-16 RX ORDER — DROPERIDOL 2.5 MG/ML
0.62 INJECTION, SOLUTION INTRAMUSCULAR; INTRAVENOUS
Status: DISCONTINUED | OUTPATIENT
Start: 2025-06-16 | End: 2025-06-16 | Stop reason: HOSPADM

## 2025-06-16 RX ORDER — DEXAMETHASONE SODIUM PHOSPHATE 4 MG/ML
INJECTION, SOLUTION INTRA-ARTICULAR; INTRALESIONAL; INTRAMUSCULAR; INTRAVENOUS; SOFT TISSUE AS NEEDED
Status: DISCONTINUED | OUTPATIENT
Start: 2025-06-16 | End: 2025-06-16 | Stop reason: SURG

## 2025-06-16 RX ORDER — PANTOPRAZOLE SODIUM 40 MG/1
40 TABLET, DELAYED RELEASE ORAL DAILY
Qty: 14 TABLET | Refills: 0 | Status: SHIPPED | OUTPATIENT
Start: 2025-06-16 | End: 2025-06-30

## 2025-06-16 RX ORDER — HYDROCODONE BITARTRATE AND ACETAMINOPHEN 7.5; 325 MG/1; MG/1
1 TABLET ORAL EVERY 4 HOURS PRN
Qty: 40 TABLET | Refills: 0 | Status: SHIPPED | OUTPATIENT
Start: 2025-06-16 | End: 2025-06-20 | Stop reason: SDUPTHER

## 2025-06-16 RX ORDER — SODIUM CHLORIDE, SODIUM LACTATE, POTASSIUM CHLORIDE, CALCIUM CHLORIDE 600; 310; 30; 20 MG/100ML; MG/100ML; MG/100ML; MG/100ML
9 INJECTION, SOLUTION INTRAVENOUS CONTINUOUS
Status: DISCONTINUED | OUTPATIENT
Start: 2025-06-16 | End: 2025-06-16 | Stop reason: HOSPADM

## 2025-06-16 RX ORDER — PROMETHAZINE HYDROCHLORIDE 25 MG/1
25 SUPPOSITORY RECTAL ONCE AS NEEDED
Status: DISCONTINUED | OUTPATIENT
Start: 2025-06-16 | End: 2025-06-16 | Stop reason: HOSPADM

## 2025-06-16 RX ORDER — PROPOFOL 10 MG/ML
VIAL (ML) INTRAVENOUS AS NEEDED
Status: DISCONTINUED | OUTPATIENT
Start: 2025-06-16 | End: 2025-06-16 | Stop reason: SURG

## 2025-06-16 RX ORDER — FLUMAZENIL 0.1 MG/ML
0.2 INJECTION INTRAVENOUS AS NEEDED
Status: DISCONTINUED | OUTPATIENT
Start: 2025-06-16 | End: 2025-06-16 | Stop reason: HOSPADM

## 2025-06-16 RX ORDER — ONDANSETRON 2 MG/ML
4 INJECTION INTRAMUSCULAR; INTRAVENOUS ONCE AS NEEDED
Status: CANCELLED | OUTPATIENT
Start: 2025-06-16

## 2025-06-16 RX ORDER — GLYCOPYRROLATE 0.2 MG/ML
INJECTION INTRAMUSCULAR; INTRAVENOUS AS NEEDED
Status: DISCONTINUED | OUTPATIENT
Start: 2025-06-16 | End: 2025-06-16 | Stop reason: SURG

## 2025-06-16 RX ORDER — HYDROCODONE BITARTRATE AND ACETAMINOPHEN 5; 325 MG/1; MG/1
1 TABLET ORAL ONCE AS NEEDED
Status: DISCONTINUED | OUTPATIENT
Start: 2025-06-16 | End: 2025-06-16 | Stop reason: HOSPADM

## 2025-06-16 RX ORDER — DIPHENHYDRAMINE HYDROCHLORIDE 50 MG/ML
12.5 INJECTION, SOLUTION INTRAMUSCULAR; INTRAVENOUS
Status: DISCONTINUED | OUTPATIENT
Start: 2025-06-16 | End: 2025-06-16 | Stop reason: HOSPADM

## 2025-06-16 RX ORDER — SODIUM CHLORIDE 0.9 % (FLUSH) 0.9 %
3-10 SYRINGE (ML) INJECTION AS NEEDED
Status: DISCONTINUED | OUTPATIENT
Start: 2025-06-16 | End: 2025-06-16 | Stop reason: HOSPADM

## 2025-06-16 RX ORDER — FENTANYL CITRATE 50 UG/ML
INJECTION, SOLUTION INTRAMUSCULAR; INTRAVENOUS AS NEEDED
Status: DISCONTINUED | OUTPATIENT
Start: 2025-06-16 | End: 2025-06-16 | Stop reason: SURG

## 2025-06-16 RX ORDER — ASPIRIN 81 MG/1
81 TABLET ORAL EVERY 12 HOURS SCHEDULED
Status: CANCELLED | OUTPATIENT
Start: 2025-06-17

## 2025-06-16 RX ORDER — DEXAMETHASONE SODIUM PHOSPHATE 4 MG/ML
INJECTION, SOLUTION INTRA-ARTICULAR; INTRALESIONAL; INTRAMUSCULAR; INTRAVENOUS; SOFT TISSUE
Status: COMPLETED | OUTPATIENT
Start: 2025-06-16 | End: 2025-06-16

## 2025-06-16 RX ORDER — ATROPINE SULFATE 0.4 MG/ML
0.4 INJECTION, SOLUTION INTRAMUSCULAR; INTRAVENOUS; SUBCUTANEOUS ONCE AS NEEDED
Status: DISCONTINUED | OUTPATIENT
Start: 2025-06-16 | End: 2025-06-16 | Stop reason: HOSPADM

## 2025-06-16 RX ORDER — POLYETHYLENE GLYCOL 3350 17 G/17G
17 POWDER, FOR SOLUTION ORAL 2 TIMES DAILY
Qty: 238 G | Refills: 0 | Status: SHIPPED | OUTPATIENT
Start: 2025-06-16 | End: 2025-06-23

## 2025-06-16 RX ORDER — MAGNESIUM HYDROXIDE 1200 MG/15ML
LIQUID ORAL AS NEEDED
Status: DISCONTINUED | OUTPATIENT
Start: 2025-06-16 | End: 2025-06-16 | Stop reason: HOSPADM

## 2025-06-16 RX ORDER — MIDAZOLAM HYDROCHLORIDE 1 MG/ML
0.5 INJECTION, SOLUTION INTRAMUSCULAR; INTRAVENOUS
Status: DISCONTINUED | OUTPATIENT
Start: 2025-06-16 | End: 2025-06-16 | Stop reason: HOSPADM

## 2025-06-16 RX ORDER — MELOXICAM 15 MG/1
15 TABLET ORAL ONCE
Status: COMPLETED | OUTPATIENT
Start: 2025-06-16 | End: 2025-06-16

## 2025-06-16 RX ORDER — PREGABALIN 75 MG/1
150 CAPSULE ORAL ONCE
Status: COMPLETED | OUTPATIENT
Start: 2025-06-16 | End: 2025-06-16

## 2025-06-16 RX ORDER — ACETAMINOPHEN 325 MG/1
650 TABLET ORAL EVERY 6 HOURS PRN
Status: CANCELLED | OUTPATIENT
Start: 2025-06-16 | End: 2025-06-19

## 2025-06-16 RX ORDER — HYDROCODONE BITARTRATE AND ACETAMINOPHEN 7.5; 325 MG/1; MG/1
1 TABLET ORAL EVERY 4 HOURS PRN
Refills: 0 | Status: CANCELLED | OUTPATIENT
Start: 2025-06-16 | End: 2025-06-23

## 2025-06-16 RX ORDER — FAMOTIDINE 10 MG/ML
20 INJECTION, SOLUTION INTRAVENOUS ONCE
Status: COMPLETED | OUTPATIENT
Start: 2025-06-16 | End: 2025-06-16

## 2025-06-16 RX ORDER — NALOXONE HCL 0.4 MG/ML
0.2 VIAL (ML) INJECTION AS NEEDED
Status: DISCONTINUED | OUTPATIENT
Start: 2025-06-16 | End: 2025-06-16 | Stop reason: HOSPADM

## 2025-06-16 RX ORDER — PROMETHAZINE HYDROCHLORIDE 25 MG/1
12.5 TABLET ORAL EVERY 4 HOURS PRN
Status: CANCELLED | OUTPATIENT
Start: 2025-06-16

## 2025-06-16 RX ORDER — FENTANYL CITRATE 50 UG/ML
25 INJECTION, SOLUTION INTRAMUSCULAR; INTRAVENOUS
Status: DISCONTINUED | OUTPATIENT
Start: 2025-06-16 | End: 2025-06-16 | Stop reason: HOSPADM

## 2025-06-16 RX ORDER — ASPIRIN 81 MG/1
TABLET ORAL
Qty: 56 TABLET | Refills: 0 | Status: SHIPPED | OUTPATIENT
Start: 2025-06-16 | End: 2025-07-28

## 2025-06-16 RX ORDER — EPHEDRINE SULFATE 50 MG/ML
5 INJECTION, SOLUTION INTRAVENOUS ONCE AS NEEDED
Status: DISCONTINUED | OUTPATIENT
Start: 2025-06-16 | End: 2025-06-16 | Stop reason: HOSPADM

## 2025-06-16 RX ORDER — ONDANSETRON 2 MG/ML
INJECTION INTRAMUSCULAR; INTRAVENOUS AS NEEDED
Status: DISCONTINUED | OUTPATIENT
Start: 2025-06-16 | End: 2025-06-16 | Stop reason: SURG

## 2025-06-16 RX ORDER — ACETAMINOPHEN 10 MG/ML
INJECTION, SOLUTION INTRAVENOUS AS NEEDED
Status: DISCONTINUED | OUTPATIENT
Start: 2025-06-16 | End: 2025-06-16 | Stop reason: SURG

## 2025-06-16 RX ORDER — MIDAZOLAM HYDROCHLORIDE 1 MG/ML
1 INJECTION, SOLUTION INTRAMUSCULAR; INTRAVENOUS
Status: DISCONTINUED | OUTPATIENT
Start: 2025-06-16 | End: 2025-06-16 | Stop reason: HOSPADM

## 2025-06-16 RX ORDER — ROCURONIUM BROMIDE 10 MG/ML
INJECTION, SOLUTION INTRAVENOUS AS NEEDED
Status: DISCONTINUED | OUTPATIENT
Start: 2025-06-16 | End: 2025-06-16 | Stop reason: SURG

## 2025-06-16 RX ORDER — ONDANSETRON 2 MG/ML
4 INJECTION INTRAMUSCULAR; INTRAVENOUS ONCE AS NEEDED
Status: DISCONTINUED | OUTPATIENT
Start: 2025-06-16 | End: 2025-06-16 | Stop reason: HOSPADM

## 2025-06-16 RX ORDER — IPRATROPIUM BROMIDE AND ALBUTEROL SULFATE 2.5; .5 MG/3ML; MG/3ML
3 SOLUTION RESPIRATORY (INHALATION) ONCE AS NEEDED
Status: DISCONTINUED | OUTPATIENT
Start: 2025-06-16 | End: 2025-06-16 | Stop reason: HOSPADM

## 2025-06-16 RX ORDER — ROPIVACAINE HYDROCHLORIDE 5 MG/ML
INJECTION, SOLUTION EPIDURAL; INFILTRATION; PERINEURAL
Status: COMPLETED | OUTPATIENT
Start: 2025-06-16 | End: 2025-06-16

## 2025-06-16 RX ORDER — HYDROCODONE BITARTRATE AND ACETAMINOPHEN 7.5; 325 MG/1; MG/1
2 TABLET ORAL EVERY 4 HOURS PRN
Refills: 0 | Status: CANCELLED | OUTPATIENT
Start: 2025-06-16 | End: 2025-06-23

## 2025-06-16 RX ORDER — LABETALOL HYDROCHLORIDE 5 MG/ML
5 INJECTION, SOLUTION INTRAVENOUS
Status: DISCONTINUED | OUTPATIENT
Start: 2025-06-16 | End: 2025-06-16 | Stop reason: HOSPADM

## 2025-06-16 RX ORDER — HYDROCODONE BITARTRATE AND ACETAMINOPHEN 7.5; 325 MG/1; MG/1
1 TABLET ORAL EVERY 4 HOURS PRN
Status: DISCONTINUED | OUTPATIENT
Start: 2025-06-16 | End: 2025-06-16 | Stop reason: HOSPADM

## 2025-06-16 RX ORDER — ONDANSETRON 4 MG/1
4 TABLET, ORALLY DISINTEGRATING ORAL EVERY 6 HOURS PRN
Status: CANCELLED | OUTPATIENT
Start: 2025-06-16

## 2025-06-16 RX ORDER — HYDROMORPHONE HYDROCHLORIDE 1 MG/ML
0.25 INJECTION, SOLUTION INTRAMUSCULAR; INTRAVENOUS; SUBCUTANEOUS
Status: DISCONTINUED | OUTPATIENT
Start: 2025-06-16 | End: 2025-06-16 | Stop reason: HOSPADM

## 2025-06-16 RX ORDER — MELOXICAM 15 MG/1
15 TABLET ORAL DAILY
Qty: 14 TABLET | Refills: 0 | Status: SHIPPED | OUTPATIENT
Start: 2025-06-16

## 2025-06-16 RX ORDER — FENTANYL CITRATE 50 UG/ML
50 INJECTION, SOLUTION INTRAMUSCULAR; INTRAVENOUS
Refills: 0 | Status: DISCONTINUED | OUTPATIENT
Start: 2025-06-16 | End: 2025-06-16 | Stop reason: HOSPADM

## 2025-06-16 RX ORDER — ONDANSETRON 4 MG/1
4 TABLET, FILM COATED ORAL EVERY 8 HOURS PRN
Qty: 10 TABLET | Refills: 0 | Status: SHIPPED | OUTPATIENT
Start: 2025-06-16 | End: 2025-06-20 | Stop reason: SDUPTHER

## 2025-06-16 RX ORDER — FENTANYL CITRATE 50 UG/ML
50 INJECTION, SOLUTION INTRAMUSCULAR; INTRAVENOUS
Status: DISCONTINUED | OUTPATIENT
Start: 2025-06-16 | End: 2025-06-16 | Stop reason: HOSPADM

## 2025-06-16 RX ORDER — TRANEXAMIC ACID 100 MG/ML
INJECTION, SOLUTION INTRAVENOUS AS NEEDED
Status: DISCONTINUED | OUTPATIENT
Start: 2025-06-16 | End: 2025-06-16 | Stop reason: SURG

## 2025-06-16 RX ORDER — HYDRALAZINE HYDROCHLORIDE 20 MG/ML
5 INJECTION INTRAMUSCULAR; INTRAVENOUS
Status: DISCONTINUED | OUTPATIENT
Start: 2025-06-16 | End: 2025-06-16 | Stop reason: HOSPADM

## 2025-06-16 RX ADMIN — FENTANYL CITRATE 50 MCG: 50 INJECTION, SOLUTION INTRAMUSCULAR; INTRAVENOUS at 10:34

## 2025-06-16 RX ADMIN — PROPOFOL 150 MCG/KG/MIN: 10 INJECTION, EMULSION INTRAVENOUS at 11:49

## 2025-06-16 RX ADMIN — SODIUM CHLORIDE, POTASSIUM CHLORIDE, SODIUM LACTATE AND CALCIUM CHLORIDE: 600; 310; 30; 20 INJECTION, SOLUTION INTRAVENOUS at 11:48

## 2025-06-16 RX ADMIN — HYDROCODONE BITARTRATE AND ACETAMINOPHEN 1 TABLET: 7.5; 325 TABLET ORAL at 13:24

## 2025-06-16 RX ADMIN — FENTANYL CITRATE 25 MCG: 50 INJECTION, SOLUTION INTRAMUSCULAR; INTRAVENOUS at 14:00

## 2025-06-16 RX ADMIN — FENTANYL CITRATE 25 MCG: 50 INJECTION, SOLUTION INTRAMUSCULAR; INTRAVENOUS at 14:30

## 2025-06-16 RX ADMIN — ACETAMINOPHEN 1000 MG: 1000 INJECTION INTRAVENOUS at 11:57

## 2025-06-16 RX ADMIN — LIDOCAINE HYDROCHLORIDE 100 MG: 20 INJECTION, SOLUTION INFILTRATION; PERINEURAL at 11:48

## 2025-06-16 RX ADMIN — HYDROMORPHONE HYDROCHLORIDE 0.5 MG: 1 INJECTION, SOLUTION INTRAMUSCULAR; INTRAVENOUS; SUBCUTANEOUS at 13:04

## 2025-06-16 RX ADMIN — MELOXICAM 15 MG: 15 TABLET ORAL at 10:29

## 2025-06-16 RX ADMIN — DEXAMETHASONE SODIUM PHOSPHATE 6 MG: 4 INJECTION, SOLUTION INTRAMUSCULAR; INTRAVENOUS at 11:55

## 2025-06-16 RX ADMIN — FENTANYL CITRATE 25 MCG: 50 INJECTION, SOLUTION INTRAMUSCULAR; INTRAVENOUS at 14:25

## 2025-06-16 RX ADMIN — ROPIVACAINE HYDROCHLORIDE 20 ML: 5 INJECTION EPIDURAL; INFILTRATION; PERINEURAL at 10:38

## 2025-06-16 RX ADMIN — ROCURONIUM BROMIDE 50 MG: 10 INJECTION, SOLUTION INTRAVENOUS at 11:48

## 2025-06-16 RX ADMIN — SODIUM CHLORIDE, POTASSIUM CHLORIDE, SODIUM LACTATE AND CALCIUM CHLORIDE 500 ML: 600; 310; 30; 20 INJECTION, SOLUTION INTRAVENOUS at 10:16

## 2025-06-16 RX ADMIN — PROPOFOL 150 MG: 10 INJECTION, EMULSION INTRAVENOUS at 11:48

## 2025-06-16 RX ADMIN — CEFAZOLIN 2 G: 2 INJECTION, POWDER, FOR SOLUTION INTRAMUSCULAR; INTRAVENOUS at 11:37

## 2025-06-16 RX ADMIN — GLYCOPYRROLATE 0.1 MG: 0.2 INJECTION INTRAMUSCULAR; INTRAVENOUS at 12:15

## 2025-06-16 RX ADMIN — FAMOTIDINE 20 MG: 10 INJECTION INTRAVENOUS at 10:48

## 2025-06-16 RX ADMIN — MIDAZOLAM 0.5 MG: 1 INJECTION INTRAMUSCULAR; INTRAVENOUS at 10:35

## 2025-06-16 RX ADMIN — PREGABALIN 75 MG: 75 CAPSULE ORAL at 10:03

## 2025-06-16 RX ADMIN — VANCOMYCIN HYDROCHLORIDE 1750 MG: 10 INJECTION, POWDER, LYOPHILIZED, FOR SOLUTION INTRAVENOUS at 10:50

## 2025-06-16 RX ADMIN — ONDANSETRON 4 MG: 2 INJECTION, SOLUTION INTRAMUSCULAR; INTRAVENOUS at 12:54

## 2025-06-16 RX ADMIN — FENTANYL CITRATE 25 MCG: 50 INJECTION, SOLUTION INTRAMUSCULAR; INTRAVENOUS at 13:55

## 2025-06-16 RX ADMIN — TRANEXAMIC ACID 1000 MG: 100 INJECTION INTRAVENOUS at 12:29

## 2025-06-16 RX ADMIN — ROCURONIUM BROMIDE 10 MG: 10 INJECTION, SOLUTION INTRAVENOUS at 12:20

## 2025-06-16 RX ADMIN — FENTANYL CITRATE 50 MCG: 50 INJECTION, SOLUTION INTRAMUSCULAR; INTRAVENOUS at 11:48

## 2025-06-16 RX ADMIN — FENTANYL CITRATE 50 MCG: 50 INJECTION, SOLUTION INTRAMUSCULAR; INTRAVENOUS at 13:24

## 2025-06-16 RX ADMIN — SUGAMMADEX 200 MG: 100 INJECTION, SOLUTION INTRAVENOUS at 13:00

## 2025-06-16 RX ADMIN — DEXAMETHASONE SODIUM PHOSPHATE 4 MG: 4 INJECTION, SOLUTION INTRA-ARTICULAR; INTRALESIONAL; INTRAMUSCULAR; INTRAVENOUS; SOFT TISSUE at 10:38

## 2025-06-16 NOTE — PROGRESS NOTES
Start PACC Note    Home Health Referral    Evaluated patient and spouse on Home Care and services available. Patient offered choice of available HHC and agreeable to PT services with [facility].    Isolation Precautions: No active isolations    START PATIENT REGISTRATION INFORMATION  Order Information  Order Signing Physician: Ty Wheeler MD  Service Ordered RN?: No  Service Ordered PT?: Yes  Service Ordered OT?: No  Service Ordered ST?: No  Service Ordered MSW?: No  Service Ordered HHA?: No  Following Physician: Ty Wheeler MD  Following Physician Phone: [phone]  Overseeing Physician: Felecia Hess APRN  (Required for Residents Only)  Agreeable to Follow ? Yes  Date/Time of Call 06/16/25 11:31 EDT, Spoke with: Per [facility] order     Care Coordination  Same Day SOC?: No  Primary Care Physician: Felecia Hess APRN  Primary Care Physician Phone: [phone]  Primary Care Physician Address: [address] [place] [address]  Visit Instructions: N/A  Service Discharge Location Type: Home  Service Facility Name: N/A  Service Floor Facility: N/A  Service Room No: N/A    Demographics  Patient Last Name: Case  Patient First Name: Renay  Language/Communication Barrier: none  Service Address: [address] [place] [address]  Service City: [place]  Service State: [place]  Service Zip: [address]  Service Home Phone: [phone]  Other Phone Numbers:   Telephone Information:   Mobile [phone]     Emergency Contact:   Extended Emergency Contact Information  Primary Emergency Contact: Pineda Perez  Mobile Phone: [phone]  Relation: Spouse    Admission Information  Admit Date: 6/16/2025  Patient Status at Discharge: Hospital outpatient surgery  Admitting Diagnosis: Primary osteoarthritis of right knee [M17.11]    Caregiver Information  Caregiver First Name:   Caregiver Last Name:   Caregiver Relationship to Patient:   Caregiver Phone Number:   Caregiver Notes:     HITECH  [employer]  HIGHTECH: HI  TECH - [facility]  Procedure: Right total knee   Date of Procedure: 6/16/25  Precautions: None  Surgeon: Ty Wheeler MD      END PATIENT REGISTRATION INFORMATION    Start PACC Summary    Additional Comments: Call  cell first, patient cell secondary    END PACC Summary    Discharge Date: Pending    Referral Source: [facility]    Signed By: Vidhi Dyer RN, 6/16/2025, 11:31 EDT     Date/Time: 06/16/25 11:31 EDT    End PACC Note

## 2025-06-16 NOTE — INTERVAL H&P NOTE
H&P reviewed.  The patient was examined and there are no changes to the H&P
H&P reviewed.  The patient was examined and there are no changes to the H&P
Yes

## 2025-06-16 NOTE — DISCHARGE PLACEMENT REQUEST
"Renay De La O (65 y.o. Female)       Date of Birth   1960    Social Security Number       Address   [address] [id number]    Home Phone   [phone]    MRN   [id number]       Christian   Quaker    Marital Status                               Admission Date       Admission Type   Elective    Admitting Provider   Ty Wheeler MD    Attending Provider   Ty Wheeler MD    Department, Room/Bed   [facility], --/--       Discharge Date       Discharge Disposition       Discharge Destination                                 Attending Provider: Ty Wheeler MD    Allergies: No Known Allergies    Isolation: None   Infection: None   Code Status: Not on file    Ht: 165.1 cm (65\")   Wt: 112 kg (247 lb 14.4 oz)    Admission Cmt: None   Principal Problem: Primary osteoarthritis of right knee [M17.11]                   Active Insurance as of 6/16/2025       Primary Coverage       Payor Plan Insurance Group Employer/Plan Group    MEDICARE MEDICARE A & B        Payor Plan Address Payor Plan Phone Number Payor Plan Fax Number Effective Dates    [address] [phone]  2/1/2025 - None Entered    [facility] [id number]         Subscriber Name Subscriber Birth Date Member ID       RENAY DE LA O 1960 [id number]               Secondary Coverage       Payor Plan Insurance Group Employer/Plan Group    HUMANA HUMANA [facility]              [id number]       Payor Plan Address Payor Plan Phone Number Payor Plan Fax Number Effective Dates    [address] [address]   2/1/2025 - None Entered    [facility] [address]         Subscriber Name Subscriber Birth Date Member ID       RENAY DE LA O 1960 [id number]                     Emergency Contacts        (Rel.) Home Phone Work Phone Mobile Phone    Pineda De La O (Spouse) -- -- [phone]              "

## 2025-06-16 NOTE — CASE MANAGEMENT/SOCIAL WORK
Continued Stay Note  [facility]     Patient Name: Renay Perez  MRN: [id number]  Today's Date: 6/16/2025    Admit Date: 6/16/2025    Plan: Home with [facility]., She does have a walker.   Discharge Plan       Row Name 06/16/25 1110       Plan    Plan Home with Religious ., She does have a walker.    Patient/Family in Agreement with Plan yes    Provided Post Acute Provider List? Yes    Post Acute Provider List Home Health    Delivered To Patient    Method of Delivery Telephone                   Discharge Codes    No documentation.                       Shannon Epley, RN

## 2025-06-16 NOTE — ANESTHESIA PROCEDURE NOTES
Airway  Reason: elective    Date/Time: 6/16/2025 11:52 AM  Airway not difficult    General Information and Staff    Patient location during procedure: OR  Anesthesiologist: Rikki Pearl MD  CRNA/CAA: Janene Jimenes CRNA    Indications and Patient Condition  Indications for airway management: airway protection    Preoxygenated: yes    Mask difficulty assessment: 1 - vent by mask    Final Airway Details    Final airway type: endotracheal airway      Successful airway: ETT  Cuffed: yes   Successful intubation technique: direct laryngoscopy  Adjuncts used in placement: intubating stylet  Endotracheal tube insertion site: oral  Blade: Janice  Blade size: 3  ETT size (mm): 7.0  Cormack-Lehane Classification: grade I - full view of glottis  Placement verified by: chest auscultation and capnometry   Cuff volume (mL): 6  Measured from: teeth  ETT/EBT  to teeth (cm): 21  Number of attempts at approach: 1  Assessment: lips, teeth, and gum same as pre-op and atraumatic intubation

## 2025-06-16 NOTE — THERAPY DISCHARGE NOTE
Patient Name: Renay Urbina Case  : 1960    MRN: [id number]                              Today's Date: 2025       Admit Date: 2025    Visit Dx:     ICD-10-CM ICD-9-CM   1. S/P total knee replacement, right  Z96.651 V43.65   2. Primary osteoarthritis of right knee  M17.11 715.16     Patient Active Problem List   Diagnosis    Primary hypertension    Other hyperlipidemia    Colon cancer screening    Primary osteoarthritis of right knee     Past Medical History:   Diagnosis Date    Cellulitis     FROM PEDICURE AT LEAST 20 YEARS    Constipation     Diabetes mellitus     PREDIABTIC    H/O hand surgery     RIGHT CARPAL TUNNEL SURGERY MAY 2025, STERISTRIPS NOTED IN PAT, CDI    History of COPD     Hyperlipidemia     Hypertension     Irregular heart beat     EXTRA BEAT    Knee swelling     Lymphedema     LEFT LEG/ SEES SPECIALIST    PONV (postoperative nausea and vomiting)     Sleep apnea     USES CPAP    Trouble in sleeping     Uterine cancer      Past Surgical History:   Procedure Laterality Date    BREAST SURGERY Left     marker put in    CARPAL TUNNEL RELEASE Right 2025    Procedure: RIGHT CARPAL TUNNEL RELEASE;  Surgeon: Lee Iglesias MD;  [facility];  Service: Hand;  Laterality: Right;    CHOLECYSTECTOMY      COLONOSCOPY      COLONOSCOPY N/A 10/28/2024    Procedure: COLONOSCOPY TO CECUM;  Surgeon: Aime Tsai MD;  Location: [facility];  Service: Gastroenterology;  Laterality: N/A;  diverticulosis, hemorrhoids    HYSTERECTOMY      UTERINE MARKER PLACED, BILATERAL TUBES AND OVARIES REMOVED AS WELL      General Information       Row Name 25 1551          Physical Therapy Time and Intention    Document Type evaluation  -RD     Mode of Treatment physical therapy  -RD       Row Name 25 1551          General Information    Patient Profile Reviewed yes  -RD     Prior Level of Function independent:  -RD     Existing Precautions/Restrictions fall  -RD      Barriers to Rehab none identified  -RD       Row Name 06/16/25 1551          Home Main Entrance    Number of Stairs, Main Entrance three  -RD       Row Name 06/16/25 1551          Cognition    Orientation Status (Cognition) oriented x 3  -RD       Row Name 06/16/25 1551          Safety Issues/Impairments Affecting Functional Mobility    Impairments Affecting Function (Mobility) pain;range of motion (ROM);strength  -RD               User Key  (r) = Recorded By, (t) = Taken By, (c) = Cosigned By      Initials Name Provider Type    Susu Pablo, PT Physical Therapist                   Mobility       Row Name 06/16/25 1552          Sit-Stand Transfer    Sit-Stand [place] (Transfers) verbal cues;contact guard  -RD     Assistive Device (Sit-Stand Transfers) walker, front-wheeled  -RD       Row Name 06/16/25 1552          Gait/Stairs (Locomotion)    [place] Level (Gait) verbal cues;contact guard  -RD     Assistive Device (Gait) walker, front-wheeled  -RD     Patient was able to Ambulate yes  -RD     Distance in Feet (Gait) 50  -RD     Deviations/Abnormal Patterns (Gait) stride length decreased;weight shifting decreased  -RD     Right Sided Gait Deviations decreased knee extension;weight shift ability decreased  -RD     [place] Level (Stairs) verbal cues;contact guard  -RD     Number of Steps (Stairs) 1  -RD     Ascending Technique (Stairs) step-to-step  -RD     Descending Technique (Stairs) step-to-step  -RD     Comment, (Gait/Stairs) did 1 stair backwards times two  -RD       Row Name 06/16/25 1552          Mobility    Extremity Weight-bearing Status right lower extremity  -RD     Right Lower Extremity (Weight-bearing Status) weight-bearing as tolerated (WBAT)  -RD               User Key  (r) = Recorded By, (t) = Taken By, (c) = Cosigned By      Initials Name Provider Type    Susu Pablo, PT Physical Therapist                   Obj/Interventions       Row Name 06/16/25 1467          Range  of Motion Comprehensive    Comment, General Range of Motion R knee grossly -5-75 with ace wrap and drain.  -RD       Row Name 06/16/25 1556          Strength Comprehensive (MMT)    Comment, General Manual Muscle Testing (MMT) Assessment R LE 3--3/5 at knee and 4/5 at ankle/hip  -RD       Row Name 06/16/25 1556          Motor Skills    Therapeutic Exercise knee  tkr protocol x 10 reps  -RD       Row Name 06/16/25 1557          Balance    Balance Assessment standing dynamic balance;sitting dynamic balance  -RD     Dynamic Sitting Balance independent  -RD     Dynamic Standing Balance contact guard  -RD     Position/Device Used, Standing Balance supported;walker, rolling  -RD       Row Name 06/16/25 1556          Sensory Assessment (Somatosensory)    Sensory Assessment (Somatosensory) sensation intact  grossly  -RD               User Key  (r) = Recorded By, (t) = Taken By, (c) = Cosigned By      Initials Name Provider Type    Susu Pablo, PT Physical Therapist                   Goals/Plan    No documentation.                  Clinical Impression       Row Name 06/16/25 1551          Pain    Pretreatment Pain Rating 6/10  -RD     Posttreatment Pain Rating 5/10  -RD     Pain Location knee  -RD     Pain Side/Orientation right  -RD     Pain Management Interventions exercise or physical activity utilized;positioning techniques utilized  -RD     Response to Pain Interventions activity participation with decreased pain  -RD       Row Name 06/16/25 1552          Plan of Care Review    Plan of Care Reviewed With patient  -RD     Progress improving  -RD     Outcome Evaluation 66 yo sp rtka.  Pt performed sit to stand transfers, gait and stairs with cga and rolling walker.  Pt is safe for DC to home.  She has rwx available to her.  Pt performed HEP and all questions were answered.  No further acute PT needed.  -RD       Row Name 06/16/25 1552          Therapy Assessment/Plan (PT)    Patient/Family Therapy Goals Statement  (PT) go home  -RD     Rehab Potential (PT) good  -RD     Criteria for Skilled Interventions Met (PT) yes  -RD     Therapy Frequency (PT) evaluation only  -RD       Row Name 06/16/25 5637          Positioning and Restraints    Pre-Treatment Position sitting in chair/recliner  -RD     Post Treatment Position chair  -RD     In Chair call light within reach;with family/caregiver  -RD               User Key  (r) = Recorded By, (t) = Taken By, (c) = Cosigned By      Initials Name Provider Type    Susu Pablo, PT Physical Therapist                   Outcome Measures       Row Name 06/16/25 1601          How much help from another person do you currently need...    Turning from your back to your side while in flat bed without using bedrails? 4  -RD     Moving from lying on back to sitting on the side of a flat bed without bedrails? 4  -RD     Moving to and from a bed to a chair (including a wheelchair)? 4  -RD     Standing up from a chair using your arms (e.g., wheelchair, bedside chair)? 4  -RD     Climbing 3-5 steps with a railing? 3  -RD     To walk in hospital room? 3  -RD     AM-PAC 6 Clicks Score (PT) 22  -RD     Highest Level of Mobility Goal Walk 25 Feet or More-7  -RD       Row Name 06/16/25 1601          Functional Assessment    Outcome Measure Options AM-PAC 6 Clicks Basic Mobility (PT)  -RD               User Key  (r) = Recorded By, (t) = Taken By, (c) = Cosigned By      Initials Name Provider Type    Susu Pablo, PT Physical Therapist                  Physical Therapy Education       Title: PT OT SLP Therapies (Done)       Topic: Physical Therapy (Done)       Point: Mobility training (Done)       Learning Progress Summary            Patient Acceptance, E,TB, VU by CESARIO at 6/16/2025 1602                      Point: Home exercise program (Done)       Learning Progress Summary            Patient Acceptance, E,TB, VU by CESARIO at 6/16/2025 1602                      Point: Body mechanics (Done)        Learning Progress Summary            Patient Acceptance, E,TB, VU by CESARIO at 6/16/2025 1602                      Point: Precautions (Done)       Learning Progress Summary            Patient Acceptance, E,TB, VU by RD at 6/16/2025 1602                                      User Key       Initials Effective Dates Name Provider Type Discipline     06/16/21 -  Susu Staley, PT Physical Therapist PT                  PT Recommendation and Plan     Progress: improving  Outcome Evaluation: 64 yo sp rtka.  Pt performed sit to stand transfers, gait and stairs with cga and rolling walker.  Pt is safe for DC to home.  She has rwx available to her.  Pt performed HEP and all questions were answered.  No further acute PT needed.     Time Calculation:   PT Evaluation Complexity  History, PT Evaluation Complexity: 3 or more personal factors and/or comorbidities  Examination of Body Systems (PT Eval Complexity): total of 4 or more elements  Clinical Presentation (PT Evaluation Complexity): evolving  Clinical Decision Making (PT Evaluation Complexity): moderate complexity  Overall Complexity (PT Evaluation Complexity): moderate complexity     PT Charges       Row Name 06/16/25 1604             Time Calculation    Start Time 1515  -      Stop Time 1535  -      Time Calculation (min) 20 min  -      PT Received On 06/16/25  -         Time Calculation- PT    Total Timed Code Minutes- PT 8 minute(s)  -                User Key  (r) = Recorded By, (t) = Taken By, (c) = Cosigned By      Initials Name Provider Type    RD Susu Staley, PT Physical Therapist                  Therapy Charges for Today       Code Description Service Date Service Provider Modifiers Qty    64462477503 HC PT EVAL MOD COMPLEXITY 2 6/16/2025 Susu Staley, PT GP 1    56699937800 HC PT THERAPEUTIC ACT EA 15 MIN 6/16/2025 Susu Staley, PT GP 1            PT G-Codes  Outcome Measure Options: AM-PAC 6 Clicks Basic Mobility (PT)  AM-PAC 6  Clicks Score (PT): 22         Susu Staley, PT  6/16/2025

## 2025-06-16 NOTE — ANESTHESIA POSTPROCEDURE EVALUATION
Patient: Renay Ray Case    Procedure Summary       Date: 06/16/25 Room / Location: [facility] OR [address] [place] [address] [facility]    Anesthesia Start: 1141 Anesthesia Stop: 1315    Procedure: TOTAL KNEE ARTHROPLASTY (Right: Knee) Diagnosis:       Primary osteoarthritis of right knee      (Primary osteoarthritis of right knee [M17.11])    Surgeons: Ty Wheeler MD Provider: Rikki Pearl MD    Anesthesia Type: general ASA Status: 3            Anesthesia Type: general    Vitals  Vitals Value Taken Time   /68 06/16/25 14:31   Temp 36.7 °C (98 °F) 06/16/25 13:15   Pulse 75 06/16/25 14:39   Resp 14 06/16/25 14:30   SpO2 96 % 06/16/25 14:39   Vitals shown include unfiled device data.        Post Anesthesia Care and Evaluation    Patient location during evaluation: bedside  Patient participation: complete - patient participated  Level of consciousness: awake  Pain management: adequate    Airway patency: patent  Anesthetic complications: No anesthetic complications    Cardiovascular status: acceptable  Respiratory status: acceptable  Hydration status: acceptable    Comments: */68   Pulse 76   Temp 36.7 °C (98 °F) (Oral)   Resp 14   LMP  (LMP Unknown)   SpO2 96%

## 2025-06-16 NOTE — ANESTHESIA PROCEDURE NOTES
Peripheral Block      Patient reassessed immediately prior to procedure    Patient location during procedure: pre-op  Start time: 6/16/2025 10:32 AM  Stop time: 6/16/2025 10:38 AM  Reason for block: at surgeon's request and post-op pain management  Performed by  Anesthesiologist: Darius Headley MD  Preanesthetic Checklist  Completed: patient identified, IV checked, site marked, risks and benefits discussed, surgical consent, monitors and equipment checked, pre-op evaluation and timeout performed  Prep:  Pt Position: supine  Sterile barriers:cap, gloves, mask, alcohol skin prep and washed/disinfected hands  Prep: ChloraPrep  Patient monitoring: blood pressure monitoring, continuous pulse oximetry and EKG  Procedure    Sedation: yes    Guidance:ultrasound guided    ULTRASOUND INTERPRETATION.  Using ultrasound guidance a 21 G gauge needle was placed in close proximity to the femoral nerve, at which point, under ultrasound guidance anesthetic was injected in the area of the nerve and spread of the anesthesia was seen on ultrasound in close proximity thereto.  There were no abnormalities seen on ultrasound; a digital image was taken; and the patient tolerated the procedure with no complications. Images:still images obtained, printed/placed on chart    Laterality:right  Block Type:adductor canal block (Femoral Nerve at Adductor Canal)  Injection Technique:single-shot  Needle Type:short-bevel  Needle Gauge:21 G  Loss of resistance: normal.    Medications Used: dexamethasone (DECADRON) injection - Injection   4 mg - 6/16/2025 10:38:00 AM  ropivacaine (NAROPIN) 0.5 % injection - Injection   20 mL - 6/16/2025 10:38:00 AM      Medications  Comment:Ultrasound Interpretation:  Ultrasound guidance utilized for visualization of needle approach to femoral artery/nerve at adductor canal level and verification of local anesthetic disbursement to surrounding tissues. Photo printed and placed on chart for reference.    Post  Assessment  Injection Assessment: negative aspiration for heme, no paresthesia on injection and incremental injection  Patient Tolerance:comfortable throughout block  Complications:no  Performed by: Darius Headley MD

## 2025-06-16 NOTE — OP NOTE
Name: Renay Urbina Case    YOB: 1960    DATE OF SURGERY: 6/16/2025    PREOPERATIVE DIAGNOSIS: Right knee end-stage osteoarthritis    POSTOPERATIVE DIAGNOSIS: Right knee end-stage osteoarthritis    PROCEDURE PERFORMED: Right total knee replacement     SURGEON: Ty Wheeler M.D.    ASSISTANT: JS HOUSER    A surgical assistant was integral in ensuring a successful outcome with this procedure.  The assistant was utilized to assist in positioning the patient, draping the patient, was used throughout the case to provide with retraction of tissues, suctioning of blood and body fluids for visualization, positioning of the extremity to allow for proper exposure so that I could perform the procedure.  Without the use of a surgical assistant during this procedure I feel that the outcome may have been compromised or would have been suboptimal or at risk for complications.    IMPLANTS: [employer]:     Implant Name Type Inv. Item Serial No.  Lot No. LRB No. Used Action   CMT BONE PALACOS R HI/VISC 1X40 - [id number] Implant CMT BONE PALACOS R HI/VISC 1X40  [facility] [id number] Right 2 Implanted   DEV CONTRL TISS STRATAFIX SYMM PDS PLUS BARB CT-1 60CM - [id number] Implant DEV CONTRL TISS STRATAFIX SYMM PDS PLUS BARB CT-1 60CM  [employer]  DIV [employer] J [employer] J [id number] Right 1 Implanted   DEV CONTRL TISS STRATAFIXSPIRALMNCRYL PLSPS2 REV3/0 45CM - [id number] Implant DEV CONTRL TISS STRATAFIXSPIRALMNCRYL PLSPS2 REV3/0 45CM  [employer]  DIV OF J AND J [id number] Right 1 Implanted   BASE TIB/KN GEN2 NONPOR TI SZ3 RT - [id number] Implant BASE TIB/KN GEN2 NONPOR TI SZ3 RT  [employer] [id number] Right 1 Implanted   COMP FEM/KN LEGION OXINIUM PS SZ4 RT - [id number] Implant COMP FEM/KN LEGION OXINIUM PS SZ4 RT  [employer] [id number] Right 1 Implanted   PAT GEN2 BICONVEX [id number] - [id number] Implant PAT GEN2 BICONVEX [id number]  [employer] [id number] Right 1 Implanted   INSRT ART/KN LEGION PS HF XLPE  SZ3TO4 11MM - [id number] Implant INSRT ART/KN LEGION PS HF XLPE SZ3TO4 11MM  [employer] [id number] Right 1 Implanted       Estimated Blood Loss: 200cc  Specimens : none  Complications: none    DESCRIPTION OF PROCEDURE: The patient was taken to the operating room and placed in the supine position. A sequential compression device was carefully placed on the non-operative leg. Preoperative antibiotics were administered. Surgical time out was performed. After adequate induction of anesthesia, the leg was prepped and draped in the usual sterile fashion, exsanguinated with an Esmarch bandage and the tourniquet inflated to 250 mmHg. A midline incision was performed followed by a medial parapatellar arthrotomy. The patella was subluxed laterally.  A portion of the fat pad, ACL, and anterior horns of the meniscus were excised.  A drill hole was then placed in the center of the femoral canal in line with the canal.  It was irrigated and suctioned.  The intramedullary katarina was then placed and a 5 degree distal valgus cut was performed after the block pinned in place appropriately.  Cut surface was then removed.  The sizing and rotation guide was then placed and seated appropriately.  It was sized and then the drill holes for the 4-in-1 cutting guide were placed in 3 degrees of external rotation based off of the posterior condyles.  The 4-in-1 cutting block was then placed and the femoral cuts were performed.  The excess bone was removed and the cut surfaces looked good.  At this point we placed the retractors around the proximal tibia and a slight release of the PCL fibers off of the posterior proximal tibia was performed.  We used the extramedullary tibial alignment guide and it was aligned appropriately and then the depth was set and the block pinned in place.  The tibial cut was then performed and the alignment guide was removed.  The tibial cut was removed and the cut surface looked good.  The posterior horns of the  menisci were then removed as well as the posterior osteophytes.  Flexion extension blocks were then used to check the balance of the knee. The tibial cut surface was then sized with the sizing templates and the tibial and femoral trial were then placed. The knee was placed in full extension and then the tibial tray rotation was then matched to the femoral rotation and marked.    Attention was then placed to the patella. The patella was noted to track centrally through range of motion. The patella was then sized with the trials. The thickness of the patella was then measured. The patella was resurfaced and the surrounding osteophytes were removed. The preoperative thickness was reproduced. The patella tracked centrally through range of motion.  We then checked the balance with the trial implants in place and there was excellent medial lateral and flexion-extension balance   At this point all trial components were removed, the knee was copiously irrigated with pulsed lavage, and the knee was injected with anesthetic cocktail solution. The cut surfaces were then dried with clean lap sponges, and the components were cemented tibia, followed by femur, then patella. The knee was held in full extension and all excess cement was removed. The knee was held still until the cement had completely hardened. We then placed the trial polyethylene spacer which resulted in full extension and excellent flexion-extension balance. We placed the final polyethylene spacer.   The knee was then copiously irrigated. The tourniquet was then released. There was excellent hemostasis. We placed a one-eighth inch Hemovac drain. We closed the knee in multiple layers in standard fashion. Sterile dressing were applied. At the end of the case, the sponge and needle counts were reported as being correct. There were no known complications. The patient was then transported to the recovery room.      Ty Wheeler M.D.

## 2025-06-16 NOTE — PLAN OF CARE
Goal Outcome Evaluation:  Plan of Care Reviewed With: patient        Progress: improving  Outcome Evaluation: 64 yo sp rtka.  Pt performed sit to stand transfers, gait and stairs with cga and rolling walker.  Pt is safe for DC to home.  She has rwx available to her.  Pt performed HEP and all questions were answered.  No further acute PT needed.

## 2025-06-16 NOTE — ANESTHESIA PREPROCEDURE EVALUATION
Anesthesia Evaluation     history of anesthetic complications:  PONV  NPO Solid Status: > 8 hours  NPO Liquid Status: > 2 hours           Airway   Mallampati: II  Neck ROM: full  no difficulty expected  Dental - normal exam     Pulmonary - normal exam   (+) COPD,sleep apnea on CPAP  (-) asthma, not a smoker    PE comment: nonlabored  Cardiovascular - normal exam  Exercise tolerance: good (4-7 METS)    Rhythm: regular  Rate: normal    (+) hypertension, dysrhythmias, hyperlipidemia  (-) valvular problems/murmurs, past MI, CAD, angina      Neuro/Psych- negative ROS  (-) seizures, TIA, CVA  GI/Hepatic/Renal/Endo    (+) morbid obesity  (-) GERD, liver disease, no renal disease, no thyroid disorderDiabetes: preDM.    Musculoskeletal     (+) arthralgias  Abdominal    Substance History      OB/GYN          Other   arthritis,   history of cancer (uterine cancer)                  Anesthesia Plan    ASA 3     general   total IV anesthesia  (AC block for post-op pain PSR)  intravenous induction     Anesthetic plan, risks, benefits, and alternatives have been provided, discussed and informed consent has been obtained with: patient.    CODE STATUS:

## 2025-06-17 ENCOUNTER — TELEPHONE (OUTPATIENT)
Dept: ORTHOPEDIC SURGERY | Facility: CLINIC | Age: 65
End: 2025-06-17
Payer: MEDICARE

## 2025-06-17 NOTE — TELEPHONE ENCOUNTER
Caller: MATTHEW    Relationship to patient: [facility]     Best call back number:     Patient is needing: MATTHEW STATED SHE EVALUATED THE PATIENT FOR PHYSICAL THERAPY TODAY AND IS NEEDING VERBAL ORDERS TO SEE THE PATIENT 3 TIMES FOR 1 WEEK. THE PATIENT STARTS OUTPATIENT P/T NEXT WEEK

## 2025-06-17 NOTE — TELEPHONE ENCOUNTER
"Called Lisa from [facility] and verbal \"ok\" for patient to start physical therapy.   "

## 2025-06-20 DIAGNOSIS — Z96.651 S/P TOTAL KNEE REPLACEMENT, RIGHT: ICD-10-CM

## 2025-06-20 RX ORDER — ONDANSETRON 4 MG/1
4 TABLET, FILM COATED ORAL EVERY 8 HOURS PRN
Qty: 10 TABLET | Refills: 0 | Status: SHIPPED | OUTPATIENT
Start: 2025-06-20

## 2025-06-20 RX ORDER — HYDROCODONE BITARTRATE AND ACETAMINOPHEN 7.5; 325 MG/1; MG/1
1 TABLET ORAL EVERY 4 HOURS PRN
Qty: 40 TABLET | Refills: 0 | Status: SHIPPED | OUTPATIENT
Start: 2025-06-20

## 2025-06-20 NOTE — TELEPHONE ENCOUNTER
Caller: Renay Perez    Relationship: Self    Best call back number: [phone] (home)       Requested Prescriptions:   Requested Prescriptions     Pending Prescriptions Disp Refills    HYDROcodone-acetaminophen (NORCO) 7.5-325 MG per tablet 40 tablet 0     Sig: Take 1 tablet by mouth Every 4 (Four) Hours As Needed for Moderate Pain or Severe Pain.    ondansetron (Zofran) 4 MG tablet 10 tablet 0     Sig: Take 1 tablet by mouth Every 8 (Eight) Hours As Needed for Nausea or Vomiting for up to 10 doses.        Pharmacy where request should be sent: [address] [address] [facility] [phone] [facility] [phone]      Last office visit with prescribing clinician: Visit date not found   Last telemedicine visit with prescribing clinician: Visit date not found   Next office visit with prescribing clinician: 8/12/2025     Additional details provided by patient: PATIENT STATES SHE IS FIGHTING NAUSEA ALL DAY LONG. AND THAT SHE IS ONLY TAKING ONE ondansetron (Zofran) 4 MG tablet A DAY. SHE HAS 4 PILLS LEFT.     Does the patient have less than a 3 day supply:  [] Yes  [x] No    Would you like a call back once the refill request has been completed: [] Yes [x] No    If the office needs to give you a call back, can they leave a voicemail: [x] Yes [] No    Von Estevez Rep   06/20/25 09:51 EDT

## 2025-06-20 NOTE — TELEPHONE ENCOUNTER
Please advise   CAR out of Office     Sx date 06/16/2025 - TKA    Last FD 06/16/2025  Last OV 06/10/2025  Next OV 07/01/2025

## 2025-06-23 ENCOUNTER — TREATMENT (OUTPATIENT)
Dept: PHYSICAL THERAPY | Facility: CLINIC | Age: 65
End: 2025-06-23
Payer: MEDICARE

## 2025-06-23 DIAGNOSIS — R26.2 DIFFICULTY WALKING: ICD-10-CM

## 2025-06-23 DIAGNOSIS — Z96.651 STATUS POST TOTAL RIGHT KNEE REPLACEMENT: ICD-10-CM

## 2025-06-23 DIAGNOSIS — G89.18 ACUTE POSTOPERATIVE PAIN OF RIGHT KNEE: Primary | ICD-10-CM

## 2025-06-23 DIAGNOSIS — M25.561 ACUTE POSTOPERATIVE PAIN OF RIGHT KNEE: Primary | ICD-10-CM

## 2025-06-23 PROCEDURE — 97110 THERAPEUTIC EXERCISES: CPT | Performed by: PHYSICAL THERAPIST

## 2025-06-23 PROCEDURE — 97530 THERAPEUTIC ACTIVITIES: CPT | Performed by: PHYSICAL THERAPIST

## 2025-06-23 PROCEDURE — 97161 PT EVAL LOW COMPLEX 20 MIN: CPT | Performed by: PHYSICAL THERAPIST

## 2025-06-23 NOTE — PROGRESS NOTES
Physical Therapy Initial Evaluation and Plan of Care  [facility]  [address], [address], [place] [id number]    Patient: Renay Urbina Case   : 1960  Diagnosis/ICD-10 Code:  Acute postoperative pain of right knee [G89.18, M25.561]  Referring practitioner: LUIGI Jack  Date: 2025    Subjective Evaluation    History of Present Illness  Date of surgery: 2025  Mechanism of injury: S/p (R) TKA by Dr. Wheeler on 2025.  Completed 1 week of home health PT. She had been taking the pain medication regularly but was having severe nausea, so she weaned from this and is now just taking Tylenol prn.  She has transitioned away from use of RWx to STC, but she does use the RWx if she gets up in the middle of the night.      She reports improving symptoms and function.  She has pain and difficulty with prolonged sitting, and she has difficulty tolerating sleeping well, waking frequently and taking a long time to fall asleep.  She prefers side sleeping.  She has 3 steps to enter/exit her home with handrail, and she has basement steps which she has not yet navigated.  Bedroom and laundry are on the main floor.      PMH notable for (L) UE and LE lymphedema for which she has been seeing a therapist up until her surgery; she was weaning a compression pump on her (L) UE and LE an hour nightly, and she wears a (L) LE compression sock while sleeping; uterine CA 10 years ago with complete hysterectomy, chemo, and radiation.      Patient Occupation: hobbies - gardening, being outside with grandkids, walking on uneven ground Quality of life: good    Pain  Current pain rating: 3  At best pain rating: 3  At worst pain rating: 3  Location: (R) anterior knee, medial and lateral aspects  Quality: dull ache  Relieving factors: ice and medications (applying ice 3 times daily for one hour; using Tylenol prn)  Aggravating factors: squatting, repetitive movement, ambulation, prolonged positioning,  sleeping, stairs, standing and movement  Progression: improved    Diagnostic Tests  No diagnostic tests performed    Patient Goals  Patient goals for therapy: decreased pain, increased strength, independence with ADLs/IADLs and increased motion           Subjective Questionnaire: LEFS: 38/80    Objective          Observations     Right Knee   Positive for incision.     Additional Knee Observation Details  (R) knee incision covered by post-op Syed bandage though SYED is no longer attached    Diffuse ecchymosis surrounding (R) knee and anterior lower leg    Active Range of Motion   Left Knee   Flexion: 118 degrees   Extension: 0 degrees   Extensor la degrees     Right Knee   Flexion: 104 degrees   Extension: 10 degrees   Extensor la degrees     Strength/Myotome Testing     Left Hip   Planes of Motion   Flexion: 4-    Right Hip   Planes of Motion   Flexion: 3-    Left Knee   Flexion: 4-  Extension: 4  Quadriceps contraction: good    Right Knee   Quadriceps contraction: fair    Left Ankle/Foot   Dorsiflexion: 4    Right Ankle/Foot   Dorsiflexion: 4-    Additional Strength Details  (R) knee MMT deferred due to insufficient (R) knee AROM    Swelling     Left Knee Girth Measurement (cm)   Joint line: 53.3.    Right Knee Girth Measurement (cm)   Joint line: 53.8.    Ambulation     Observational Gait   Gait: antalgic   Decreased walking speed, stride length, right stance time and left step length.   Base of support: increased    Additional Observational Gait Details  Patient ambulates with STC (L) UE, decreased (R) LE weightshift and stance time, decreased (R) knee flexion during swing and extension during stance, and decreased (L) LE step length          Assessment & Plan       Assessment  Impairments: abnormal gait, abnormal muscle firing, abnormal or restricted ROM, activity intolerance, impaired balance, impaired physical strength, lacks appropriate home exercise program and pain with function   Functional  limitations: sleeping, walking, uncomfortable because of pain, moving in bed, sitting, standing, stooping and unable to perform repetitive tasks   Assessment details: Patient is a 65 y.o female s/p (R) TKA by Dr. Wheeler on 06/16/2025.  She completed 1 week of home health PT and her post-operative course has been uneventful.  (R) TKA incision remains covered by post-operative dressing but there are no evident signs of excessive redness or infection. She rates symptoms 3/10 and notes difficulty with sleeping, sitting, standing, walking, and negotiating steps. She demonstrates (R) knee AROM of  degrees, mild (R) knee swelling, and moderate gait compensations while ambulating with STC (L) UE.  Her LEFS score is 38/80 indicating a moderate perceived level of functional limitation.  She will benefit from skilled PT services to address these deficits in order to optimize functional activity and mobility recovery for improved QOL.  Prognosis: good    Goals  Plan Goals: STGs: to be met in 6 weeks  1. Patient will be independent with initial HEP  2. Patient will report improved (R) knee symptoms 0-2/10 for improved tolerance to ADLs  3. Patient will report consistently sleeping without waking more than once nightly due to pain for improved restorative healing  4. Patient will have improved (R) knee AROM 0-115 degrees for improved joint mobility and stability during stance phase of gait  5. Patient will have improved (R) knee joint line girth equal to (L) knee as evidence of resolving inflammation    LTGs: to be met in 12 weeks  1. Patient will be independent with progressed HEP  2. Patient will report resolution of (R) knee symptoms for normal positional, activity, and mobility tolerance  3. Patient will demonstrate improved (R) knee AROM 0-125 deg for improved joint mobility and tolerance to squatting/stooping  4. Patient will exhibit improved (R) LE strength >/= 4+/5 for improved functional strength with sit to stand  transfers, walking, and stair negotiation  5. Patient will demonstrate ability to negotiate 1 flight of steps reciprocally for improved navigation of basement steps within her home  6. Patient will consistently be able to play outside in the yard with her grandchildren to her satisfaction  7. Patient will have improved LEFS score >/= 47/80 for subjective evidence of functional improvement    Plan  Therapy options: will be seen for skilled therapy services  Planned modality interventions: cryotherapy, thermotherapy (hydrocollator packs) and electrical stimulation/Russian stimulation  Planned therapy interventions: ADL retraining, balance/weight-bearing training, flexibility, functional ROM exercises, gait training, home exercise program, joint mobilization, manual therapy, neuromuscular re-education, soft tissue mobilization, strengthening, stretching and therapeutic activities  Frequency: 3x week  Duration in weeks: 12  Treatment plan discussed with: patient        TIMED:  Manual Therapy:         mins  16166;  Therapeutic Exercise:    21     mins  47169;     Neuromuscular Tamela:        mins  00882;    Therapeutic Activity:     11     mins  15364;     Gait Training:           mins  85782;     Ultrasound:          mins  51500;    Work Conditioning             mins 41561    UNTIMED:  Electrical Stimulation:         mins  91664 ( );  Dry Needling          mins 16431    Timed Treatment:   32   mins   Total Treatment:     49   mins    PT SIGNATURE: Deisi Ratliff PT, DPT, OCS  Electronically signed by: Deisi Ratliff PT, 06/23/25, 11:26 AM EDT  [place] License #[id number]     DATE TREATMENT INITIATED: 6/23/2025    Medicare Initial Certification  Certification Period: 6/23/2025 thru 9/20/2025  I certify that the therapy services are furnished while this patient is under my care.  The services outlined above are required by this patient, and will be reviewed every 90 days.     PHYSICIAN: Troy Mckenna, APRN  [id number]                                           DATE:     Please sign and return via fax to [phone]. Thank you, [facility].

## 2025-06-25 ENCOUNTER — TREATMENT (OUTPATIENT)
Dept: PHYSICAL THERAPY | Facility: CLINIC | Age: 65
End: 2025-06-25
Payer: MEDICARE

## 2025-06-25 DIAGNOSIS — M25.561 ACUTE POSTOPERATIVE PAIN OF RIGHT KNEE: Primary | ICD-10-CM

## 2025-06-25 DIAGNOSIS — R26.2 DIFFICULTY WALKING: ICD-10-CM

## 2025-06-25 DIAGNOSIS — G89.18 ACUTE POSTOPERATIVE PAIN OF RIGHT KNEE: Primary | ICD-10-CM

## 2025-06-25 DIAGNOSIS — Z96.651 STATUS POST TOTAL RIGHT KNEE REPLACEMENT: ICD-10-CM

## 2025-06-25 PROCEDURE — 97530 THERAPEUTIC ACTIVITIES: CPT | Performed by: PHYSICAL THERAPIST

## 2025-06-25 PROCEDURE — 97110 THERAPEUTIC EXERCISES: CPT | Performed by: PHYSICAL THERAPIST

## 2025-06-25 NOTE — PROGRESS NOTES
Physical Therapy Daily Treatment Note       [facility]           [address], [address], [place] [address]    Patient: Renay Urbina Case   : 1960  Referring practitioner: LUIGI Jack  Date of Initial Visit: Type: THERAPY  Noted: 2025  Today's Date: 2025  Patient seen for 2 sessions         Visit Diagnoses:     ICD-10-CM ICD-9-CM   1. Acute postoperative pain of right knee  G89.18 719.46    M25.561 338.18   2. Status post total right knee replacement  Z96.651 V43.65   3. Difficulty walking  R26.2 719.7         Subjective Evaluation    History of Present Illness    Subjective comment: My knee is not feeling so good today; it just feels so so stiff.  Sleeping is so rough; I just can't hardly get comfortable to sleep. My  says I am walking like I have a peg leg.       Objective          Active Range of Motion     Right Knee   Flexion: 100 degrees with pain  Extension: 8 degrees       See Exercise, Manual, and Modality Logs for complete treatment.   *Initiated NuStep for (R) knee ROM, supine heel slides for knee flexion AAROM, and hooklying hip add tamir vs ball    Assessment & Plan       Assessment  Assessment details: Patient exhibits positive response to initiation of NuStep recumbent stepper to promote increased knee flexion ROM and decreased perceived stiffness.  She is educated in increasing (R) knee active flexion during swing phase of gait with encouragement to work on this walking up and down her hallway at home a few times per day.  She demonstrates slightly increased stiffness of (R) knee flexion today but slightly increased knee extension ROM.  She is encouraged to avoid focusing too much on ROM numbers but rather stay consistent with HEP regularly in addition to ice regimen.  She is instructed to stretch her (R) knee in the hour before going to bed and apply ice immediately before going to bed in an effort to improve sleep tolerance.             Progress per Plan of Care with emphasis on recovery of (R) knee AROM.         TIMED:  Manual Therapy:         mins  46335;  Therapeutic Exercise:    28     mins  33383;     Neuromuscular Tamela:        mins  97264;    Therapeutic Activity:     13     mins  83328;     Gait Training:           mins  10653;     Ultrasound:          mins  81248;    Work Conditioning             mins 65758    UNTIMED:  Electrical Stimulation:         mins  36093 ( );  Dry Needling          mins 20561    Timed Treatment:   41   mins   Total Treatment:     41   mins    Deisi Ratliff PT, DPT, [facility]  Physical Therapist  [place] License #[id number]  Electronically signed by: Deisi Ratliff PT, 06/25/25, 11:28 AM EDT

## 2025-06-27 ENCOUNTER — TREATMENT (OUTPATIENT)
Dept: PHYSICAL THERAPY | Facility: CLINIC | Age: 65
End: 2025-06-27
Payer: MEDICARE

## 2025-06-27 DIAGNOSIS — G89.18 ACUTE POSTOPERATIVE PAIN OF RIGHT KNEE: Primary | ICD-10-CM

## 2025-06-27 DIAGNOSIS — R26.2 DIFFICULTY WALKING: ICD-10-CM

## 2025-06-27 DIAGNOSIS — M25.561 ACUTE POSTOPERATIVE PAIN OF RIGHT KNEE: Primary | ICD-10-CM

## 2025-06-27 DIAGNOSIS — Z96.651 STATUS POST TOTAL RIGHT KNEE REPLACEMENT: ICD-10-CM

## 2025-06-27 PROCEDURE — 97110 THERAPEUTIC EXERCISES: CPT | Performed by: PHYSICAL THERAPIST

## 2025-06-27 PROCEDURE — 97530 THERAPEUTIC ACTIVITIES: CPT | Performed by: PHYSICAL THERAPIST

## 2025-06-27 NOTE — PROGRESS NOTES
Physical Therapy Daily Treatment Note       [facility] Physical Therapy           [address], [address], [place] [id number]    Patient: Renay Urbina Case   : 1960  Referring practitioner: LUIGI Jack  Date of Initial Visit: Type: THERAPY  Noted: 2025  Today's Date: 2025  Patient seen for 3 sessions         Visit Diagnoses:     ICD-10-CM ICD-9-CM   1. Acute postoperative pain of right knee  G89.18 719.46    M25.561 338.18   2. Status post total right knee replacement  Z96.651 V43.65   3. Difficulty walking  R26.2 719.7         Subjective Evaluation    History of Present Illness    Subjective comment: My knee is doing alright.  It doesn't feel like it's going as straight today.  But it is getting easier to put my socks on.Pain  Pain scale: 2-3/10.           Objective          Active Range of Motion     Right Knee   Flexion: 105 degrees   Extension: 5 (foot on floor) degrees       See Exercise, Manual, and Modality Logs for complete treatment.   *Increased exercise repetitions as appropriate  *Initiated SAQ and SLR AAROM     Assessment & Plan       Assessment  Assessment details: Patient demonstrates good improvements in knee AROM flexion and extension. She demonstrates gradual improvement in neuromuscular control of (R) LE such that she consistently lifts her (R) LE on/off mat table and NuStep independently with apparent increased ease.  However, she is significantly challenged by SLR activity requiring assist of PT to perform through 50% ROM arc only.  She is encouraged to begin working on this activity at home to further improve (R) LE strength and neuromuscular control to facilitate normalization of gait without AD and improved ability to negotiate steps.          Progress per Plan of Care; reassess for MD.         TIMED:  Manual Therapy:         mins  43747;  Therapeutic Exercise:    31     mins  83221;     Neuromuscular Tamela:        mins  51732;    Therapeutic Activity:      10     mins  73989;     Gait Training:           mins  18635;     Ultrasound:          mins  42426;    Work Conditioning             mins 97800    UNTIMED:  Electrical Stimulation:         mins  66082 ( );  Dry Needling          mins 20561    Timed Treatment:   41   mins   Total Treatment:     41   mins    Deisi Raltiff PT, DPT, OCS  Physical Therapist  [place] License #[id number]  Electronically signed by: Deisi Ratliff PT, 06/27/25, 8:49 AM EDT

## 2025-06-30 ENCOUNTER — TREATMENT (OUTPATIENT)
Dept: PHYSICAL THERAPY | Facility: CLINIC | Age: 65
End: 2025-06-30
Payer: MEDICARE

## 2025-06-30 DIAGNOSIS — Z96.651 STATUS POST TOTAL RIGHT KNEE REPLACEMENT: ICD-10-CM

## 2025-06-30 DIAGNOSIS — R26.2 DIFFICULTY WALKING: ICD-10-CM

## 2025-06-30 DIAGNOSIS — G89.18 ACUTE POSTOPERATIVE PAIN OF RIGHT KNEE: Primary | ICD-10-CM

## 2025-06-30 DIAGNOSIS — M25.561 ACUTE POSTOPERATIVE PAIN OF RIGHT KNEE: Primary | ICD-10-CM

## 2025-06-30 PROCEDURE — 97110 THERAPEUTIC EXERCISES: CPT | Performed by: PHYSICAL THERAPIST

## 2025-06-30 PROCEDURE — 97530 THERAPEUTIC ACTIVITIES: CPT | Performed by: PHYSICAL THERAPIST

## 2025-06-30 NOTE — PROGRESS NOTES
"     Physical Therapy Daily Treatment Note       [facility] Physical Therapy           [address], [address], [place] [id number]    Patient: Renay Urbina Case   : 1960  Referring practitioner: LUIGI Jack  Date of Initial Visit: Type: THERAPY  Noted: 2025  Today's Date: 2025  Patient seen for 4 sessions         Visit Diagnoses:     ICD-10-CM ICD-9-CM   1. Acute postoperative pain of right knee  G89.18 719.46    M25.561 338.18   2. Status post total right knee replacement  Z96.651 V43.65   3. Difficulty walking  R26.2 719.7         Subjective Evaluation    History of Present Illness    Subjective comment: My knee is doing great.  I'm not even taking Tylenol anymore.  I can walk a little bit without the cane. Still not sleeping great but it's a little better.Pain  Current pain ratin (\"it's really not bad\")  Location: (R) knee           Objective          Active Range of Motion     Right Knee   Flexion: 105 degrees   Extension: 2 degrees     Swelling     Right Knee Girth Measurement (cm)   Joint line: 53.3.      See Exercise, Manual, and Modality Logs for complete treatment.       Assessment & Plan       Assessment  Assessment details: Patient has been highly motivated with early PT efforts.  She reports minimal (R) knee discomfort during the day rated 2/10 but more pain that affects her sleep at night. She demonstrates good (R) knee AROM currently measured at 2-105 degrees.  Swelling is moderate at this time but improved from evaluation.  She is able to perform a minimal (R) LE SLR with great effort at this time without knee extensor lag.  She ambulates short community distances with STC (L) UE with moderate compensation including lack of TKE during stance and lack of active knee flexion during swing.  She is progressing steadily toward all functional PT goals at this time and will benefit to continue skilled PT services for optimal functional outcome.          Other - patient " to f/u with Dr. Wheeler tomorrow.  Recommend continued PT to optimize (R) knee ROM, (R) LE strength, and quality of gait.         TIMED:  Manual Therapy:         mins  26749;  Therapeutic Exercise:    28     mins  95697;     Neuromuscular Tamela:        mins  17459;    Therapeutic Activity:     12     mins  81325;     Gait Training:           mins  63045;     Ultrasound:          mins  99461;    Work Conditioning             mins 94808    UNTIMED:  Electrical Stimulation:         mins  55804 ( );  Dry Needling          mins 96752    Timed Treatment:   40   mins   Total Treatment:     40   mins    Deisi Ratliff PT, DPT, [facility]  Physical Therapist  [place] License #[id number]  Electronically signed by: Deisi Ratliff PT, 06/30/25, 12:47 PM EDT

## 2025-06-30 NOTE — LETTER
"Physical Therapy Daily Treatment Note       [facility] Physical Therapy           [address], [address], [place] [id number]    Patient: Renay Urbina Case   : 1960  Referring practitioner: LUIGI Jack  Date of Initial Visit: Type: THERAPY  Noted: 2025  Today's Date: 2025  Patient seen for 4 sessions         Visit Diagnoses:     ICD-10-CM ICD-9-CM   1. Acute postoperative pain of right knee  G89.18 719.46    M25.561 338.18   2. Status post total right knee replacement  Z96.651 V43.65   3. Difficulty walking  R26.2 719.7         Subjective Evaluation    History of Present Illness    Subjective comment: My knee is doing great.  I'm not even taking Tylenol anymore.  I can walk a little bit without the cane. Still not sleeping great but it's a little better.Pain  Current pain ratin (\"it's really not bad\")  Location: (R) knee           Objective          Active Range of Motion     Right Knee   Flexion: 105 degrees   Extension: 2 degrees     Swelling     Right Knee Girth Measurement (cm)   Joint line: 53.3.      See Exercise, Manual, and Modality Logs for complete treatment.       Assessment & Plan       Assessment  Assessment details: Patient has been highly motivated with early PT efforts.  She reports minimal (R) knee discomfort during the day rated 2/10 but more pain that affects her sleep at night. She demonstrates good (R) knee AROM currently measured at 2-105 degrees.  Swelling is moderate at this time but improved from evaluation.  She is able to perform a minimal (R) LE SLR with great effort at this time without knee extensor lag.  She ambulates short community distances with STC (L) UE with moderate compensation including lack of TKE during stance and lack of active knee flexion during swing.  She is progressing steadily toward all functional PT goals at this time and will benefit to continue skilled PT services for optimal functional outcome.          Other - patient to " f/u with Dr. Wheeler tomorrow.  Recommend continued PT to optimize (R) knee ROM, (R) LE strength, and quality of gait.        Deisi Ratliff PT, DPT, OCS  [place] Lic #[id number]

## 2025-07-01 ENCOUNTER — OFFICE VISIT (OUTPATIENT)
Dept: ORTHOPEDIC SURGERY | Facility: CLINIC | Age: 65
End: 2025-07-01
Payer: MEDICARE

## 2025-07-01 VITALS — BODY MASS INDEX: 42.32 KG/M2 | HEIGHT: 65 IN | TEMPERATURE: 97.5 F | WEIGHT: 254 LBS

## 2025-07-01 DIAGNOSIS — Z96.651 STATUS POST RIGHT KNEE REPLACEMENT: Primary | ICD-10-CM

## 2025-07-01 PROCEDURE — 1160F RVW MEDS BY RX/DR IN RCRD: CPT | Performed by: ORTHOPAEDIC SURGERY

## 2025-07-01 PROCEDURE — 1159F MED LIST DOCD IN RCRD: CPT | Performed by: ORTHOPAEDIC SURGERY

## 2025-07-01 PROCEDURE — 99024 POSTOP FOLLOW-UP VISIT: CPT | Performed by: ORTHOPAEDIC SURGERY

## 2025-07-01 NOTE — PROGRESS NOTES
Renay Urbina Case : 1960 MRN: [id number] DATE: 2025    DIAGNOSIS: 2 week follow up right total knee      SUBJECTIVE:Patient returns today for 2 week follow up of right total knee replacement. Patient reports doing well with no unusual complaints. Appears to be progressing appropriately.    OBJECTIVE:   Exam:. The incision is healing appropriately. No sign of infection. Range of motion is progressing as expected. The calf is soft and nontender with a negative Homans sign.    ASSESSMENT: 2 week status post right knee replacement.    PLAN: 1) Staples removed and steri strips applied   2) Order given for PT   3) Discontinue ALEXA hose   4) Continue ice PRN   5) aspirin 81 mg orally every day for 1 month   6) Follow up in 6 weeks with repeat Xrays of right knee (3views)    Ty Wheeler MD  2025

## 2025-07-02 ENCOUNTER — TREATMENT (OUTPATIENT)
Dept: PHYSICAL THERAPY | Facility: CLINIC | Age: 65
End: 2025-07-02
Payer: MEDICARE

## 2025-07-02 DIAGNOSIS — Z96.651 STATUS POST TOTAL RIGHT KNEE REPLACEMENT: ICD-10-CM

## 2025-07-02 DIAGNOSIS — M25.561 ACUTE POSTOPERATIVE PAIN OF RIGHT KNEE: Primary | ICD-10-CM

## 2025-07-02 DIAGNOSIS — R26.2 DIFFICULTY WALKING: ICD-10-CM

## 2025-07-02 DIAGNOSIS — G89.18 ACUTE POSTOPERATIVE PAIN OF RIGHT KNEE: Primary | ICD-10-CM

## 2025-07-02 PROCEDURE — 97530 THERAPEUTIC ACTIVITIES: CPT | Performed by: PHYSICAL THERAPIST

## 2025-07-02 PROCEDURE — 97110 THERAPEUTIC EXERCISES: CPT | Performed by: PHYSICAL THERAPIST

## 2025-07-02 NOTE — PROGRESS NOTES
Physical Therapy Daily Treatment Note       [facility] Physical Therapy           [address], [address], [place] [id number]    Patient: Renay Urbina Case   : 1960  Referring practitioner: LUIGI Jack  Date of Initial Visit: Type: THERAPY  Noted: 2025  Today's Date: 2025  Patient seen for 5 sessions         Visit Diagnoses:     ICD-10-CM ICD-9-CM   1. Acute postoperative pain of right knee  G89.18 719.46    M25.561 338.18   2. Status post total right knee replacement  Z96.651 V43.65   3. Difficulty walking  R26.2 719.7         Subjective Evaluation    History of Present Illness    Subjective comment: I saw Dr. Wheeler yesterday and he is pleased with how I am doing.  They took the bandage off and put steristrips on. My knee is feeling really good today.       Objective   See Exercise, Manual, and Modality Logs for complete treatment.   *Increased reps of SLR    Assessment & Plan       Assessment  Assessment details: Patient remains motivated and compliant with PT interventions.  She relays that she received a good report from her surgeon yesterday who is pleased with her progress to date.  She is able to increase repetitions of (R) LE SLR activity through approximately 50% ROM arc now but with greatly increased fatigue and difficulty.  She exhibits signs of steadily improving neuromuscular control of (R) LE.  She voices motivation to return to driving and is encouraged to emphasize SLR activity for improved strength of (R) LE and ability to move laterally between gas and brake pedals.            Progress per Plan of Care. Reassess (R) knee ROM.         TIMED:  Manual Therapy:         mins  39142;  Therapeutic Exercise:    29     mins  93969;     Neuromuscular Tamela:        mins  29086;    Therapeutic Activity:     11     mins  57807;     Gait Training:           mins  31463;     Ultrasound:          mins  61270;    Work Conditioning             mins 50630    UNTIMED:  Electrical  Stimulation:         mins  15150 ( );  Dry Needling          mins 17879    Timed Treatment:   40   mins   Total Treatment:     40   mins    Deisi Ratliff PT, DPT, OCS  Physical Therapist  [place] License #[id number]  Electronically signed by: Deisi Ratliff PT, 07/02/25, 8:51 AM EDT

## 2025-07-07 ENCOUNTER — TREATMENT (OUTPATIENT)
Dept: PHYSICAL THERAPY | Facility: CLINIC | Age: 65
End: 2025-07-07
Payer: MEDICARE

## 2025-07-07 DIAGNOSIS — Z96.651 STATUS POST TOTAL RIGHT KNEE REPLACEMENT: ICD-10-CM

## 2025-07-07 DIAGNOSIS — M25.561 ACUTE POSTOPERATIVE PAIN OF RIGHT KNEE: Primary | ICD-10-CM

## 2025-07-07 DIAGNOSIS — R26.2 DIFFICULTY WALKING: ICD-10-CM

## 2025-07-07 DIAGNOSIS — G89.18 ACUTE POSTOPERATIVE PAIN OF RIGHT KNEE: Primary | ICD-10-CM

## 2025-07-07 PROCEDURE — 97110 THERAPEUTIC EXERCISES: CPT | Performed by: PHYSICAL THERAPIST

## 2025-07-07 PROCEDURE — 97530 THERAPEUTIC ACTIVITIES: CPT | Performed by: PHYSICAL THERAPIST

## 2025-07-07 NOTE — PROGRESS NOTES
Physical Therapy Daily Treatment Note       [facility] Physical Therapy           [address], [address], [place] [id number]    Patient: Renay Urbina Case   : 1960  Referring practitioner: LUIGI Jack  Date of Initial Visit: Type: THERAPY  Noted: 2025  Today's Date: 2025  Patient seen for 6 sessions         Visit Diagnoses:     ICD-10-CM ICD-9-CM   1. Acute postoperative pain of right knee  G89.18 719.46    M25.561 338.18   2. Status post total right knee replacement  Z96.651 V43.65   3. Difficulty walking  R26.2 719.7         Subjective Evaluation    History of Present Illness    Subjective comment: My knee is doing well and getting better.  I can walk some without my cane now.  I'm still using ice because I still feel like there is swelling around my knee.       Objective          Active Range of Motion     Right Knee   Flexion: 115 degrees   Extension: 1 (foot supported) degrees   Extensor la degrees       See Exercise, Manual, and Modality Logs for complete treatment.   *Initiated LAQ  *Increased reps of SAQ    Assessment & Plan       Assessment  Assessment details: Patient remains highly motivated with all PT efforts at this time.  She demonstrates improved (R) knee flexion AROM of 1-115 degrees indicating very good joint mobility and soft tissue balance surrounding the (R) knee, but with a 17 degree knee extensor lag indicating significant functional weakness of (R) quadriceps muscle.  She demonstrates ability to ambulate short distances within PT clinic without AD but there are gait  deficits to include mild lack of TKE during stance phase of gait, decreased (R) LE stance time, decreased (R) active knee flexion during swing phase of gait, and decreased strength and power of push-off at (R) terminal stance.  For this reason, encouraged patient to continue using STC (L) UE within the community for about one more week but she may begin working on improving  gait mechanics withini her home without AD at this time.            Progress strengthening /stabilization /functional activity         TIMED:  Manual Therapy:         mins  15680;  Therapeutic Exercise:    28     mins  85237;     Neuromuscular Tamela:        mins  77710;    Therapeutic Activity:     10     mins  51266;     Gait Training:           mins  25460;     Ultrasound:          mins  97895;    Work Conditioning             mins 60492    UNTIMED:  Electrical Stimulation:         mins  39585 ( );  Dry Needling          mins 20671    Timed Treatment:   38   mins   Total Treatment:     44   mins    Deisi Ratliff PT, DPT, [facility]  Physical Therapist  [place] License #[id number]  Electronically signed by: Deisi Ratliff PT, 07/07/25, 9:34 AM EDT

## 2025-07-09 ENCOUNTER — TREATMENT (OUTPATIENT)
Dept: PHYSICAL THERAPY | Facility: CLINIC | Age: 65
End: 2025-07-09
Payer: MEDICARE

## 2025-07-09 DIAGNOSIS — M25.561 ACUTE POSTOPERATIVE PAIN OF RIGHT KNEE: Primary | ICD-10-CM

## 2025-07-09 DIAGNOSIS — R26.2 DIFFICULTY WALKING: ICD-10-CM

## 2025-07-09 DIAGNOSIS — G89.18 ACUTE POSTOPERATIVE PAIN OF RIGHT KNEE: Primary | ICD-10-CM

## 2025-07-09 DIAGNOSIS — Z96.651 STATUS POST TOTAL RIGHT KNEE REPLACEMENT: ICD-10-CM

## 2025-07-09 PROCEDURE — 97530 THERAPEUTIC ACTIVITIES: CPT | Performed by: PHYSICAL THERAPIST

## 2025-07-09 PROCEDURE — 97110 THERAPEUTIC EXERCISES: CPT | Performed by: PHYSICAL THERAPIST

## 2025-07-09 NOTE — PROGRESS NOTES
"     Physical Therapy Daily Treatment Note       [facility] Physical Therapy           [address], [address], [place] [address]    Patient: Renay Urbina Case   : 1960  Referring practitioner: LUIGI Jack  Date of Initial Visit: Type: THERAPY  Noted: 2025  Today's Date: 2025  Patient seen for 7 sessions         Visit Diagnoses:     ICD-10-CM ICD-9-CM   1. Acute postoperative pain of right knee  G89.18 719.46    M25.561 338.18   2. Status post total right knee replacement  Z96.651 V43.65   3. Difficulty walking  R26.2 719.7         Subjective Evaluation    History of Present Illness    Subjective comment: I was a little slacking on my exercises yesterday -- I did ride the bike for 20 minutes and do a few stretches but not everything.  My knee feels really good overall, but I do feel a little catch in it every now and then while I am walking.       Objective   See Exercise, Manual, and Modality Logs for complete treatment.   *Initiated standing heel raises and FW step ups (4\")    Assessment & Plan       Assessment  Assessment details: Patient remains highly motivated with PT interventions to regain optimal (R) knee ROM and functional ability.  She exhibits improved AROM arc with (R) LE SLR activity with slightly decreased fatigue level noted.  She is able to initiate forward step ups at 4\" height with fair to good concentric (R) quadriceps strength and fair eccentric control upon stepdown.  She exhibits good improvement in neuromuscular control of (R) LE as evidenced by improved ability to actively move (R) LE on/off mat table.          Progress strengthening /stabilization /functional activity         TIMED:  Manual Therapy:         mins  31138;  Therapeutic Exercise:    34     mins  07903;     Neuromuscular Tamela:        mins  30840;    Therapeutic Activity:     12     mins  99580;     Gait Training:           mins  69036;     Ultrasound:          mins  69604;    Work Conditioning  "            mins 87270    UNTIMED:  Electrical Stimulation:         mins  14509 ( );  Dry Needling          mins 89876    Timed Treatment:   46   mins   Total Treatment:     46   mins    Deisi Ratliff PT, DPT, [facility]  Physical Therapist  [place] License #[id number]  Electronically signed by: Deisi Ratliff PT, 07/09/25, 10:31 AM EDT

## 2025-07-11 ENCOUNTER — TREATMENT (OUTPATIENT)
Dept: PHYSICAL THERAPY | Facility: CLINIC | Age: 65
End: 2025-07-11
Payer: MEDICARE

## 2025-07-11 DIAGNOSIS — Z96.651 STATUS POST TOTAL RIGHT KNEE REPLACEMENT: ICD-10-CM

## 2025-07-11 DIAGNOSIS — G89.18 ACUTE POSTOPERATIVE PAIN OF RIGHT KNEE: Primary | ICD-10-CM

## 2025-07-11 DIAGNOSIS — R26.2 DIFFICULTY WALKING: ICD-10-CM

## 2025-07-11 DIAGNOSIS — M25.561 ACUTE POSTOPERATIVE PAIN OF RIGHT KNEE: Primary | ICD-10-CM

## 2025-07-11 PROCEDURE — 97110 THERAPEUTIC EXERCISES: CPT | Performed by: PHYSICAL THERAPIST

## 2025-07-11 PROCEDURE — 97530 THERAPEUTIC ACTIVITIES: CPT | Performed by: PHYSICAL THERAPIST

## 2025-07-11 PROCEDURE — 97112 NEUROMUSCULAR REEDUCATION: CPT | Performed by: PHYSICAL THERAPIST

## 2025-07-11 NOTE — PROGRESS NOTES
Physical Therapy Daily Treatment Note       [facility]           [address], [address], [place] [id number]    Patient: Renay Urbina Case   : 1960  Referring practitioner: LUIGI Jack  Date of Initial Visit: Type: THERAPY  Noted: 2025  Today's Date: 2025  Patient seen for 8 sessions         Visit Diagnoses:     ICD-10-CM ICD-9-CM   1. Acute postoperative pain of right knee  G89.18 719.46    M25.561 338.18   2. Status post total right knee replacement  Z96.651 V43.65   3. Difficulty walking  R26.2 719.7         Subjective Evaluation    History of Present Illness    Subjective comment: My [quad muscle] was really sore after the new exercises [step ups] the other day.  I went home and put ice on my knee pretty much right away.  I have been working on my walking, trying to use good posture and trying not to lean side to side, going up and down my driveway at home.       Objective          Active Range of Motion     Right Knee   Flexion: 115 degrees   Extension: 1 degrees   Extensor la degrees       See Exercise, Manual, and Modality Logs for complete treatment.   *Initiated TKE vs ball in standing    Assessment & Plan       Assessment  Assessment details: Patient remains motivated and compliant with PT interventions.  She exhibits mild persistent gait deficits without AD including increased lateral trunk sway and signs of functional hip weakness, but overall quality of gait without AD is improved such that PT instructs patient to begin ambulating short community distances without AD but continue using it for longer distances.  She verbalizes understanding.  (R) knee extensor lag is improved from 17 degrees to 8 degrees, indicating improving strength of (R) quadriceps mm.  TKE is initiated in closed chain to address persistent (R) hip and quad weakness and to improve stability of her gait without AD. She exhibits good tolerance for initiation of this exercise  but does experience early onset of muscular fatigue.          Progress strengthening /stabilization /functional activity         TIMED:  Manual Therapy:         mins  28061;  Therapeutic Exercise:    30     mins  46563;     Neuromuscular Tamela:    10    mins  26873;    Therapeutic Activity:     13     mins  12520;     Gait Training:           mins  81687;     Ultrasound:          mins  80994;    Work Conditioning             mins 99282    UNTIMED:  Electrical Stimulation:         mins  02855 ( );  Dry Needling          mins 32865    Timed Treatment:   53   mins   Total Treatment:     53   mins    Deisi Ratliff PT, DPT, [facility]  Physical Therapist  [place] License #[id number]  Electronically signed by: Deisi Ratliff PT, 07/11/25, 9:56 AM EDT

## 2025-07-14 ENCOUNTER — TELEPHONE (OUTPATIENT)
Dept: ORTHOPEDICS | Facility: OTHER | Age: 65
End: 2025-07-14
Payer: MEDICARE

## 2025-07-17 ENCOUNTER — TREATMENT (OUTPATIENT)
Dept: PHYSICAL THERAPY | Facility: CLINIC | Age: 65
End: 2025-07-17
Payer: MEDICARE

## 2025-07-17 DIAGNOSIS — G89.18 ACUTE POSTOPERATIVE PAIN OF RIGHT KNEE: Primary | ICD-10-CM

## 2025-07-17 DIAGNOSIS — M25.561 ACUTE POSTOPERATIVE PAIN OF RIGHT KNEE: Primary | ICD-10-CM

## 2025-07-17 DIAGNOSIS — R26.2 DIFFICULTY WALKING: ICD-10-CM

## 2025-07-17 DIAGNOSIS — Z96.651 STATUS POST TOTAL RIGHT KNEE REPLACEMENT: ICD-10-CM

## 2025-07-17 NOTE — PROGRESS NOTES
Physical Therapy Daily Progress Note      Patient: Renay Urbina Case   : 1960  Diagnosis/ICD-10 Code:  Acute postoperative pain of right knee [G89.18, M25.561]  Referring practitioner: LUIGI Jack  Date of Initial Visit: Type: THERAPY  Noted: 2025  Today's Date: 2025  Patient seen for 9 sessions             Subjective Evaluation    History of Present Illness    Subjective comment: My knee is okay but I think I over did it a couple times this weekend. Walked through [employer] and [employer] because I was feeling good, but right when we got back in the car, the discomfort started. No problems walking and have done 5-6 steps, but I haven't tackled the basement stairs. My sleeping still isn't great but getting better. Drove my  to the ER, which was the first time I have driven since before surgery.       Objective          Active Range of Motion     Right Knee   Flexion: 119 degrees   Extension: 0 degrees       See Exercise, Manual, and Modality Logs for complete treatment.       Assessment & Plan       Assessment  Assessment details: Pt tolerated today's treatment with minimal discomfort and pain in the R knee. Pt displays notable improvement in R AROM knee flexion, specifically while assuming the bent knee position during supine hip isometric ball squeezes, in addition to increased R AROM knee flexion measurement (115 to 119 deg). Pt also reports improvements in walking ability. Functional seated R knee presses are implemented this session to simulate seated driving experience, due to pt reported difficulty with this activity. Plan to initiate sit to stands to improve LE strength and stability for further gains toward optimal functional outcomes.         Progress per Plan of Care           Manual Therapy:         mins  68755;  Therapeutic Exercise:    40     mins  48653;     Neuromuscular Tamela:        mins  87058;    Therapeutic Activity:     13     mins  20826;     Gait Training:            mins  04709;     Ultrasound:          mins  46645;    Electrical Stimulation:         mins  30628 ( );  Dry Needling          mins self-pay    Timed Treatment:   53   mins   Total Treatment:     53   mins    Deisi Ratliff, PT, DPT, OCS  Physical Therapist      *All patient care provided by PT student on this date is overseen by Deisi Ratliff PT, DPT, OCS who is actively directing the service, making the skilled judgment, responsible for the treatment and present in the room for the entirety of the session guiding the student in service delivery.

## 2025-07-22 ENCOUNTER — TREATMENT (OUTPATIENT)
Dept: PHYSICAL THERAPY | Facility: CLINIC | Age: 65
End: 2025-07-22
Payer: MEDICARE

## 2025-07-22 DIAGNOSIS — Z96.651 STATUS POST TOTAL RIGHT KNEE REPLACEMENT: ICD-10-CM

## 2025-07-22 DIAGNOSIS — R26.2 DIFFICULTY WALKING: ICD-10-CM

## 2025-07-22 DIAGNOSIS — M25.561 ACUTE POSTOPERATIVE PAIN OF RIGHT KNEE: Primary | ICD-10-CM

## 2025-07-22 DIAGNOSIS — G89.18 ACUTE POSTOPERATIVE PAIN OF RIGHT KNEE: Primary | ICD-10-CM

## 2025-07-22 PROCEDURE — 97110 THERAPEUTIC EXERCISES: CPT | Performed by: PHYSICAL THERAPIST

## 2025-07-22 PROCEDURE — 97530 THERAPEUTIC ACTIVITIES: CPT | Performed by: PHYSICAL THERAPIST

## 2025-07-22 NOTE — PROGRESS NOTES
Physical Therapy Daily Progress Note    Patient: Renay Urbina Case   : 1960  Diagnosis/ICD-10 Code:  Acute postoperative pain of right knee [G89.18, M25.561]  Referring practitioner: LUIGI Jack  Date of Initial Visit: Type: THERAPY  Noted: 2025  Today's Date: 2025  Patient seen for 10 sessions             Subjective Evaluation    History of Present Illness    Subjective comment: I am feeling good today and my knee hasnt bothered me all weekend. I made sure to take it easy when walking through [employer]. I am still having a hard time getting comfortable when trying to sleep at night. Driving is better; I have been driving places occasionally, and I'm having no problems.       Objective   See Exercise, Manual, and Modality Logs for complete treatment.   *Increased exercise repetitions as appropriate  *Assessed stair negotiation - reciprocally and non-reciprocally    Assessment & Plan       Assessment  Assessment details: Patient demonstrates further normalization of gait with minimal compensation remaining.  Her stair negotiation ability is assessed in PT clinic.  Patient is able to ascend 2 steps reciprocally ascending using (B) handrails with great effort, and 2 steps descending reciprocally with (B) handrails and even greater effort and lack of eccentric control during final 50% of ROM.  She is able to increase repetitions of several strengthening exercises with good tolerance and mild increase in fatigue.  Plan further progressions of both ROM and strengthening exercises to optimize functional recovery.        Progress strengthening /stabilization /functional activity           TIMED:  Manual Therapy:         mins  36288;  Therapeutic Exercise:    38     mins  60782;     Neuromuscular Tamela:        mins  43829;    Therapeutic Activity:     15     mins  68376;     Gait Training:           mins  78329;     Ultrasound:          mins  45260;      UNTIMED:  Electrical Stimulation:         mins   Problem: Alteration in Thoughts and Perception  Goal: Refrain from acting on delusional thinking/internal stimuli  Description  Interventions:  - Monitor patient closely, per order   - Utilize least restrictive measures   - Set reasonable limits, give positive feedback for acceptable   - Administer medications as ordered and monitor of potential side effects  Outcome: Progressing  Goal: Agree to be compliant with medication regime, as prescribed and report medication side effects  Description  Interventions:  - Offer appropriate PRN medication and supervise ingestion; conduct AIMS, as needed   Outcome: Lg Pelletier has been intermittently visible on the unit, requiring prompting to get out of bed for his medications but was compliant when asked to do so  Preoccupied with his clothing that he has in storage but has been redirectable by staff  Pleasant and cooperative during conversation with this writer  Becomes more animated and involved in scheduled activities as the shift progresses, social with peers and staff  Behaviors appropriate  Will continue to monitor for changes  94460 ( );  Dry Needling          mins self-pay    Timed Treatment:   53   mins   Total Treatment:     53   mins    Deisi Ratliff PT, DPT, OCS  Physical Therapist  [place] Lic #[id number]

## 2025-07-24 ENCOUNTER — TREATMENT (OUTPATIENT)
Dept: PHYSICAL THERAPY | Facility: CLINIC | Age: 65
End: 2025-07-24
Payer: MEDICARE

## 2025-07-24 DIAGNOSIS — G89.18 ACUTE POSTOPERATIVE PAIN OF RIGHT KNEE: Primary | ICD-10-CM

## 2025-07-24 DIAGNOSIS — Z96.651 STATUS POST TOTAL RIGHT KNEE REPLACEMENT: ICD-10-CM

## 2025-07-24 DIAGNOSIS — M25.561 ACUTE POSTOPERATIVE PAIN OF RIGHT KNEE: Primary | ICD-10-CM

## 2025-07-24 DIAGNOSIS — R26.2 DIFFICULTY WALKING: ICD-10-CM

## 2025-07-24 NOTE — PROGRESS NOTES
Physical Therapy Daily Progress Note      Patient: Renay Urbina Case   : 1960  Diagnosis/ICD-10 Code:  Acute postoperative pain of right knee [G89.18, M25.561]  Referring practitioner: LUIGI Jack  Date of Initial Visit: Type: THERAPY  Noted: 2025  Today's Date: 2025  Patient seen for 11 sessions             Subjective Evaluation    History of Present Illness  Mechanism of injury:       Subjective comment: My knee is not doing good today. Yesterday, after I went down my basement staircase, I experienced sharp 10/10 pain in my R knee. I think I went down too fast, and mad at myself for doing too much too soon. After the incident, while we were at [employer], my R knee was throbbing. I iced my knee when we got home, and I couldnt fall asleep that night, so I took a hydrocodone, which I havent had to take since right after the surgery.Pain  Current pain ratin  At worst pain rating: 10 (after stair incident)           Objective   See Exercise, Manual, and Modality Logs for complete treatment.       Assessment & Plan       Assessment  Assessment details: Pt completed tx with significant provocation of s/s in the R knee, d/t yesterday's reported episode of onset of 10/10 pain after descending stairs in her basement. Despite pt's willingness to perform strengthening activities of the R knee, the previously progressed strengthening/standing exercises were deferred today until next visit, d/t reported s/s. Pt responded well to the limited exercises today, and tolerated R knee mobility activities, with moderate (5/10) pain/discomfort worst with strengthening (SAQ, LAQ). Pt was educated that her R knee progress is not linear and that setbacks such as her recent incident, don't dictate future successful recovery outcomes. Pt was encouraged to increase the frequency of ice application over the next few days, in addition to simply performing HEP stretches, rather than the strengthening exercises, in order  to allow for R knee irritation/inflammation to subside. Plan to revisit the previously implemented R LE strengthening activities next session,  dependent on pt s/s, for facilitation of successful recovery outcomes.   Prognosis details:           Progress strengthening /stabilization /functional activity             TIMED:  Manual Therapy:         mins  45364;  Therapeutic Exercise:    30     mins  53799;     Neuromuscular Tamela:        mins  96013;    Therapeutic Activity:     10     mins  92923;     Gait Training:           mins  80736;     Ultrasound:          mins  96830;      UNTIMED:  Electrical Stimulation:         mins  35950 ( );  Dry Needling          mins self-pay    Timed Treatment:   40   mins   Total Treatment:     50   mins    Deisi Ratliff, PT, DPT, OCS  Physical Therapist  [place] Lic #[id number]    *All patient care provided by PT student on this date is overseen by Deisi Ratliff PT, KHANHT, HOA who is actively directing the service, making the skilled judgment, responsible for the treatment and present in the room for the entirety of the session guiding the student in service delivery.

## 2025-07-29 ENCOUNTER — TREATMENT (OUTPATIENT)
Dept: PHYSICAL THERAPY | Facility: CLINIC | Age: 65
End: 2025-07-29
Payer: MEDICARE

## 2025-07-29 DIAGNOSIS — Z96.651 STATUS POST TOTAL RIGHT KNEE REPLACEMENT: ICD-10-CM

## 2025-07-29 DIAGNOSIS — M25.561 ACUTE POSTOPERATIVE PAIN OF RIGHT KNEE: Primary | ICD-10-CM

## 2025-07-29 DIAGNOSIS — G89.18 ACUTE POSTOPERATIVE PAIN OF RIGHT KNEE: Primary | ICD-10-CM

## 2025-07-29 DIAGNOSIS — R26.2 DIFFICULTY WALKING: ICD-10-CM

## 2025-07-29 PROCEDURE — 97530 THERAPEUTIC ACTIVITIES: CPT | Performed by: PHYSICAL THERAPIST

## 2025-07-29 PROCEDURE — 97110 THERAPEUTIC EXERCISES: CPT | Performed by: PHYSICAL THERAPIST

## 2025-07-29 NOTE — PROGRESS NOTES
Physical Therapy Daily Progress Note        Patient: Renay Urbina Case   : 1960  Diagnosis/ICD-10 Code:  Acute postoperative pain of right knee [G89.18, M25.561]  Referring practitioner: LUIGI Jack  Date of Initial Visit: Type: THERAPY  Noted: 2025  Today's Date: 2025  Patient seen for 12 sessions             Subjective Evaluation    History of Present Illness    Subjective comment: My knee still doesnt feel great today, and I thought it would be better by this point. Monday, it hurt so bad that I wanted to call Dr. Wheeler's office for an X-ray, but my son (Tyron) said to give it time. I have been doing the bike and my exercises, and lots and lots of ice all around my knee. It feels warm to the touch and still a little swollen. Overall, it has improved from 8/10 to 6/10, but not completely better.Pain  Current pain ratin           Objective   See Exercise, Manual, and Modality Logs for complete treatment.       Assessment & Plan       Assessment  Assessment details: Patient expresses some discouragement regarding persistent increase in (R) knee symptoms, though they are improved since last week (6/10 vs 8/10).  She is able to reincorporate SLR activity into program today (with decreased repetitions), but majority of closed chain strengthening is otherwise deferred until symptoms settle further.  She is encouraged to remain diligent with frequent ice application to (R) knee, frequent stretching of (R) knee into flexion and extension, and avoiding prolonged positions at this time.  Plan reincorporation and progression of closed chain strength and stabilization activities as s/s allow.        Other - reassess.           TIMED:  Manual Therapy:         mins  08061;  Therapeutic Exercise:    33     mins  66167;     Neuromuscular Tamela:        mins  24423;    Therapeutic Activity:     13     mins  56707;     Gait Training:           mins  73472;     Ultrasound:          mins  29021;       UNTIMED:  Electrical Stimulation:         mins  72873 ( );  Dry Needling          mins self-pay    Timed Treatment:   47   mins   Total Treatment:     50   mins    Deisi Ratliff PT, DPT, OCS  Physical Therapist  [place] Lic #[id number]

## 2025-07-31 ENCOUNTER — TREATMENT (OUTPATIENT)
Dept: PHYSICAL THERAPY | Facility: CLINIC | Age: 65
End: 2025-07-31
Payer: MEDICARE

## 2025-07-31 DIAGNOSIS — M25.561 ACUTE POSTOPERATIVE PAIN OF RIGHT KNEE: Primary | ICD-10-CM

## 2025-07-31 DIAGNOSIS — R26.2 DIFFICULTY WALKING: ICD-10-CM

## 2025-07-31 DIAGNOSIS — Z96.651 STATUS POST TOTAL RIGHT KNEE REPLACEMENT: ICD-10-CM

## 2025-07-31 DIAGNOSIS — G89.18 ACUTE POSTOPERATIVE PAIN OF RIGHT KNEE: Primary | ICD-10-CM

## 2025-07-31 PROCEDURE — 97110 THERAPEUTIC EXERCISES: CPT | Performed by: PHYSICAL THERAPIST

## 2025-07-31 PROCEDURE — 97112 NEUROMUSCULAR REEDUCATION: CPT | Performed by: PHYSICAL THERAPIST

## 2025-07-31 PROCEDURE — 97530 THERAPEUTIC ACTIVITIES: CPT | Performed by: PHYSICAL THERAPIST

## 2025-07-31 NOTE — PROGRESS NOTES
Re-Assessment / Re-Certification  [facility]  [address], [address], [place] [id number]    Patient: Renay Urbina Case   : 1960  Diagnosis/ICD-10 Code:  Acute postoperative pain of right knee [G89.18, M25.561]  Referring practitioner: LUIGI Jack  Date of Initial Visit: 2025  Today's Date: 2025  Patient seen for 13 sessions      Subjective:   Subjective Questionnaire: LEFS: 52/80  Clinical Progress: improved  Home Program Compliance: Yes  Treatment has included: therapeutic exercise, neuromuscular re-education, therapeutic activity, and gait training    Subjective Evaluation    History of Present Illness    Subjective comment: My knee is doing a lot better.  The pain has come down from a 6/10 to 4/10.  I'm walking and moving around better.  I'm still waking up about 5 times per night, only sleeping about 2 hours at a time.Pain  Current pain ratin  Location: (R) knee         Objective          Active Range of Motion     Right Knee   Flexion: 122 degrees   Extension: 0 degrees   Extensor lag: 15 degrees     Swelling     Right Knee Girth Measurement (cm)   Joint line: 49.1.    Ambulation     Observational Gait     Additional Observational Gait Details  Level ground ambulation is very minimally compensated by lack of TKE and push-off (R) LE; no AD used.      Assessment & Plan       Assessment  Assessment details: Patient has remained motivated and compliant with PT interventions.  She has overall reported steady improvements in (R) knee symptoms and function, though she did experience a flareup of symptoms following negotiating her basement steps which required just over a week to settle.  She reports (R) knee symptoms rated up to 4/10 and describes persistent difficulty with activities such as walking longer distances and negotiating steps.  She exhibits good improvements in (R) knee girth and AROM.  She consistently ambulates without AD with minimal compensation  (R) LE but lack of TKE and functional strength of (R) LE during terminal stance/pushoff are evident.  Her LEFS score of 52/80 indicates a moderate perceived level of functional limitation. She will benefit from continued skilled PT services to address (R) LE functional strength deficits to optimize post-operative recovery and QOL.    Goals  Plan Goals: STGs: to be met in 4 weeks  1. Patient will be independent with initial HEP - MET  2. Patient will report improved (R) knee symptoms 0-2/10 for improved tolerance to ADLs - NOT MET/IMPROVING  3. Patient will report consistently sleeping without waking more than once nightly due to pain for improved restorative healing - NOT MET  4. Patient will have improved (R) knee AROM 0-115 degrees for improved joint mobility and stability during stance phase of gait - MET  5. Patient will have improved (R) knee joint line girth equal to (L) knee as evidence of resolving inflammation - MET     LTGs: to be met in 8 weeks  1. Patient will be independent with progressed HEP - NOT MET  2. Patient will report resolution of (R) knee symptoms for normal positional, activity, and mobility tolerance - NOT MET/IMPROVING  3. Patient will demonstrate improved (R) knee AROM 0-125 deg for improved joint mobility and tolerance to squatting/stooping - PARTIALLY MET/IMPROVING  4. Patient will exhibit improved (R) LE strength >/= 4+/5 for improved functional strength with sit to stand transfers, walking, and stair negotiation - NOT ASSESSED  5. Patient will demonstrate ability to negotiate 1 flight of steps reciprocally for improved navigation of basement steps within her home - NOT MET  6. Patient will consistently be able to play outside in the yard with her grandchildren to her satisfaction - NOT MET  7. Patient will have improved LEFS score >/= 47/80 for subjective evidence of functional improvement - NOT MET/IMPROVED           Progress toward previous goals: Partially Met   Recommendations:  Continue as planned  Timeframe: 2 months  Prognosis to achieve goals: good    PT Signature: Deisi Ratliff, PT, DPT, OCS  [place] License # [id number]      Based upon review of the patient's progress and continued therapy plan, it is my medical opinion that Renay Perez should [facility]  [address] [place] [address] [address]  [facility] [address] [phone].    Signature: __________________________________  Troy Mckenna APRN    TIMED:  Manual Therapy:         mins  86336;  Therapeutic Exercise:    31     mins  48508;     Neuromuscular Tamela:    13    mins  65983;    Therapeutic Activity:     14     mins  63159;     Gait Training:           mins  54977;     Ultrasound:          mins  39912;    Work Conditioning             mins 44174    UNTIMED:  Electrical Stimulation:         mins  41313 ( );  Dry Needling          mins 86250    Timed Treatment:   58   mins   Total Treatment:     58   mins    Please sign and return via fax to [phone]. Thank you, [facility].

## 2025-08-05 ENCOUNTER — TREATMENT (OUTPATIENT)
Dept: PHYSICAL THERAPY | Facility: CLINIC | Age: 65
End: 2025-08-05
Payer: MEDICARE

## 2025-08-05 DIAGNOSIS — M25.561 ACUTE POSTOPERATIVE PAIN OF RIGHT KNEE: Primary | ICD-10-CM

## 2025-08-05 DIAGNOSIS — G89.18 ACUTE POSTOPERATIVE PAIN OF RIGHT KNEE: Primary | ICD-10-CM

## 2025-08-05 DIAGNOSIS — R26.2 DIFFICULTY WALKING: ICD-10-CM

## 2025-08-05 DIAGNOSIS — Z96.651 STATUS POST TOTAL RIGHT KNEE REPLACEMENT: ICD-10-CM

## 2025-08-05 PROCEDURE — 97530 THERAPEUTIC ACTIVITIES: CPT | Performed by: PHYSICAL THERAPIST

## 2025-08-05 PROCEDURE — 97110 THERAPEUTIC EXERCISES: CPT | Performed by: PHYSICAL THERAPIST

## 2025-08-07 ENCOUNTER — TREATMENT (OUTPATIENT)
Dept: PHYSICAL THERAPY | Facility: CLINIC | Age: 65
End: 2025-08-07
Payer: MEDICARE

## 2025-08-07 DIAGNOSIS — R26.2 DIFFICULTY WALKING: ICD-10-CM

## 2025-08-07 DIAGNOSIS — M25.561 ACUTE POSTOPERATIVE PAIN OF RIGHT KNEE: Primary | ICD-10-CM

## 2025-08-07 DIAGNOSIS — Z96.651 STATUS POST TOTAL RIGHT KNEE REPLACEMENT: ICD-10-CM

## 2025-08-07 DIAGNOSIS — G89.18 ACUTE POSTOPERATIVE PAIN OF RIGHT KNEE: Primary | ICD-10-CM

## 2025-08-07 PROCEDURE — 97110 THERAPEUTIC EXERCISES: CPT | Performed by: PHYSICAL THERAPIST

## 2025-08-07 PROCEDURE — 97530 THERAPEUTIC ACTIVITIES: CPT | Performed by: PHYSICAL THERAPIST

## 2025-08-07 PROCEDURE — 97112 NEUROMUSCULAR REEDUCATION: CPT | Performed by: PHYSICAL THERAPIST

## 2025-08-10 ENCOUNTER — HOSPITAL ENCOUNTER (OUTPATIENT)
Facility: HOSPITAL | Age: 65
Discharge: HOME | End: 2025-08-10
Attending: EMERGENCY MEDICINE | Admitting: EMERGENCY MEDICINE
Payer: MEDICARE

## 2025-08-10 ENCOUNTER — APPOINTMENT (OUTPATIENT)
Facility: HOSPITAL | Age: 65
End: 2025-08-10
Payer: MEDICARE

## 2025-08-10 VITALS
OXYGEN SATURATION: 96 % | SYSTOLIC BLOOD PRESSURE: 136 MMHG | RESPIRATION RATE: 20 BRPM | DIASTOLIC BLOOD PRESSURE: 62 MMHG | TEMPERATURE: 98.3 F | HEART RATE: 77 BPM | WEIGHT: 251.77 LBS | BODY MASS INDEX: 41.95 KG/M2 | HEIGHT: 65 IN

## 2025-08-10 DIAGNOSIS — J20.9 ACUTE BRONCHITIS WITH BRONCHOSPASM: Primary | ICD-10-CM

## 2025-08-10 PROCEDURE — G0463 HOSPITAL OUTPT CLINIC VISIT: HCPCS | Performed by: EMERGENCY MEDICINE

## 2025-08-10 PROCEDURE — 71046 X-RAY EXAM CHEST 2 VIEWS: CPT

## 2025-08-10 RX ORDER — IPRATROPIUM BROMIDE 17 UG/1
2 AEROSOL, METERED RESPIRATORY (INHALATION)
Qty: 12.9 G | Refills: 0 | Status: SHIPPED | OUTPATIENT
Start: 2025-08-10 | End: 2025-08-17

## 2025-08-10 RX ORDER — ALBUTEROL SULFATE 90 UG/1
2 INHALANT RESPIRATORY (INHALATION)
Qty: 6.7 G | Refills: 0 | Status: SHIPPED | OUTPATIENT
Start: 2025-08-11 | End: 2025-08-18

## 2025-08-12 ENCOUNTER — OFFICE VISIT (OUTPATIENT)
Dept: ORTHOPEDIC SURGERY | Facility: CLINIC | Age: 65
End: 2025-08-12
Payer: MEDICARE

## 2025-08-12 VITALS — TEMPERATURE: 97.3 F | HEIGHT: 65 IN | WEIGHT: 252.2 LBS | BODY MASS INDEX: 42.02 KG/M2

## 2025-08-12 DIAGNOSIS — Z96.651 STATUS POST RIGHT KNEE REPLACEMENT: Primary | ICD-10-CM

## 2025-08-12 RX ORDER — BENZONATATE 200 MG/1
CAPSULE ORAL EVERY 8 HOURS SCHEDULED
COMMUNITY
Start: 2025-08-06 | End: 2025-08-16

## 2025-08-12 RX ORDER — CEPHALEXIN 500 MG/1
CAPSULE ORAL
Qty: 4 CAPSULE | Refills: 5 | Status: SHIPPED | OUTPATIENT
Start: 2025-08-12

## 2025-08-12 RX ORDER — MELOXICAM 7.5 MG/1
1 TABLET ORAL DAILY
COMMUNITY
Start: 2025-08-04

## 2025-08-19 ENCOUNTER — TREATMENT (OUTPATIENT)
Dept: PHYSICAL THERAPY | Facility: CLINIC | Age: 65
End: 2025-08-19
Payer: MEDICARE

## 2025-08-19 DIAGNOSIS — M25.561 ACUTE POSTOPERATIVE PAIN OF RIGHT KNEE: Primary | ICD-10-CM

## 2025-08-19 DIAGNOSIS — Z96.651 STATUS POST TOTAL RIGHT KNEE REPLACEMENT: ICD-10-CM

## 2025-08-19 DIAGNOSIS — G89.18 ACUTE POSTOPERATIVE PAIN OF RIGHT KNEE: Primary | ICD-10-CM

## 2025-08-19 DIAGNOSIS — R26.2 DIFFICULTY WALKING: ICD-10-CM

## 2025-08-19 PROCEDURE — 97112 NEUROMUSCULAR REEDUCATION: CPT | Performed by: PHYSICAL THERAPIST

## 2025-08-19 PROCEDURE — 97110 THERAPEUTIC EXERCISES: CPT | Performed by: PHYSICAL THERAPIST

## 2025-08-19 PROCEDURE — 97530 THERAPEUTIC ACTIVITIES: CPT | Performed by: PHYSICAL THERAPIST

## (undated) DEVICE — CANN O2 ETCO2 FITS ALL CONN CO2 SMPL A/ 7IN DISP LF

## (undated) DEVICE — ADAPT CLN SCPE ENDO PORPOISE BX/50 DISP

## (undated) DEVICE — SUT VIC 1 CT1 36IN J947H

## (undated) DEVICE — PATIENT RETURN ELECTRODE, SINGLE-USE, CONTACT QUALITY MONITORING, ADULT, WITH 9FT CORD, FOR PATIENTS WEIGING OVER 33LBS. (15KG): Brand: MEGADYNE

## (undated) DEVICE — APPL DURAPREP IODOPHOR APL 26ML

## (undated) DEVICE — XEROFORM OCCLUSIVE GAUZE STRIP OVERWRAP, 3% BISMUTH TRIBROMOPHENATE IN PETROLATUM BLEND: Brand: XEROFORM

## (undated) DEVICE — THE STERILE LIGHT HANDLE COVER IS USED WITH STERIS SURGICAL LIGHTING AND VISUALIZATION SYSTEMS.

## (undated) DEVICE — GAUZE SPONGES,12 PLY: Brand: CURITY

## (undated) DEVICE — 450 ML BOTTLE OF 0.05% CHLORHEXIDINE GLUCONATE IN 99.95% STERILE WATER FOR IRRIGATION, USP AND APPLICATOR.: Brand: IRRISEPT ANTIMICROBIAL WOUND LAVAGE

## (undated) DEVICE — GLV SURG BIOGEL LTX PF 7

## (undated) DEVICE — 3M™ IOBAN™ 2 ANTIMICROBIAL INCISE DRAPE 6640EZ: Brand: IOBAN™ 2

## (undated) DEVICE — 1527-0 TRANSPORE TAPE         1/2INX10YD 24/BX 10BX/CS: Brand: 3M™ TRANSPORE™

## (undated) DEVICE — IRRIGATOR BULB ASEPTO 60CC STRL

## (undated) DEVICE — ANTIBACTERIAL UNDYED BRAIDED (POLYGLACTIN 910), SYNTHETIC ABSORBABLE SUTURE: Brand: COATED VICRYL

## (undated) DEVICE — KT DRN EVAC WND PVC PCH WTROC RND 10F400

## (undated) DEVICE — STCKNT RL COTN BIAS/CT 4IN 50YD NS

## (undated) DEVICE — UNDERGLV SURG BIOGEL INDICATOR LF PF 7.5

## (undated) DEVICE — GOWN PROC ENDOARMOR GI LVL3 HY/SHLD UNIV

## (undated) DEVICE — GOWN ISOL W/THUMB UNIV BLU BX/15

## (undated) DEVICE — PREP SOL POVIDONE/IODINE BT 4OZ

## (undated) DEVICE — DRSNG SURESITE WNDW 2.38X2.75

## (undated) DEVICE — PK KN TOTL 40

## (undated) DEVICE — HAND PACK: Brand: MEDLINE INDUSTRIES, INC.

## (undated) DEVICE — DISPOSABLE BIPOLAR FORCEPS 4" (10.2CM) JEWELERS, STRAIGHT 0.4MM TIP AND 12 FT. (3.6M) CABLE: Brand: KIRWAN

## (undated) DEVICE — UNDERCAST PADDING: Brand: DEROYAL

## (undated) DEVICE — SOL IRR NACL 0.9PCT 3000ML

## (undated) DEVICE — SKIN PREP TRAY 4 COMPARTM TRAY: Brand: MEDLINE INDUSTRIES, INC.

## (undated) DEVICE — DISPOSABLE TOURNIQUET CUFF SINGLE BLADDER, SINGLE PORT AND QUICK CONNECT CONNECTOR: Brand: COLOR CUFF

## (undated) DEVICE — GLV SURG SENSICARE PI PF LF 7 GRN STRL

## (undated) DEVICE — YANKAUER,BULB TIP,W/O VENT,RIGID,STERILE: Brand: MEDLINE

## (undated) DEVICE — INTENDED TO SUPPORT AND MAINTAIN THE POSITION OF AN ANESTHETIZED PATIENT DURING SURGERY: Brand: HERMANTOR XL PINK KNEE POSITIONING PAD

## (undated) DEVICE — FLEX ADVANTAGE 1500CC: Brand: FLEX ADVANTAGE

## (undated) DEVICE — GLV SURG SENSICARE W/ALOE PF LF 7.5 STRL

## (undated) DEVICE — GLV SURG SENSICARE PI MIC PF SZ7 LF STRL

## (undated) DEVICE — PAD UNDERCAST WYTEX 2IN 4YD LF STRL

## (undated) DEVICE — BNDG,ELSTC,MATRIX,STRL,6"X5YD,LF,HOOK&LP: Brand: MEDLINE

## (undated) DEVICE — PREMIUM DRY TRAY LF: Brand: MEDLINE INDUSTRIES, INC.

## (undated) DEVICE — TRAP FLD MINIVAC MEGADYNE 100ML

## (undated) DEVICE — SUT PROLN 4/0 PS2 18IN BLU

## (undated) DEVICE — GLV SURG SENSICARE W/ALOE PF LF 8 STRL

## (undated) DEVICE — NEEDLE, QUINCKE 22GX3.5": Brand: MEDLINE INDUSTRIES, INC.

## (undated) DEVICE — KT ORCA ORCAPOD DISP STRL

## (undated) DEVICE — SOL ANTISEP SCRB HIBICLENS CHG4PCT 8OZ

## (undated) DEVICE — DUAL CUT SAGITTAL BLADE

## (undated) DEVICE — SYS SKIN EXOFIN WND CLS 4X22CM

## (undated) DEVICE — Device